# Patient Record
Sex: MALE | Race: WHITE | Employment: OTHER | URBAN - METROPOLITAN AREA
[De-identification: names, ages, dates, MRNs, and addresses within clinical notes are randomized per-mention and may not be internally consistent; named-entity substitution may affect disease eponyms.]

---

## 2017-06-14 PROBLEM — R00.1 SYMPTOMATIC BRADYCARDIA: Status: ACTIVE | Noted: 2017-06-14

## 2017-06-14 PROBLEM — I95.9 HYPOTENSION: Status: ACTIVE | Noted: 2017-06-14

## 2019-10-02 ENCOUNTER — OFFICE VISIT (OUTPATIENT)
Dept: CARDIOLOGY CLINIC | Age: 84
End: 2019-10-02

## 2019-10-02 VITALS
WEIGHT: 220 LBS | OXYGEN SATURATION: 97 % | DIASTOLIC BLOOD PRESSURE: 72 MMHG | BODY MASS INDEX: 29.8 KG/M2 | HEIGHT: 72 IN | SYSTOLIC BLOOD PRESSURE: 106 MMHG | RESPIRATION RATE: 18 BRPM | HEART RATE: 68 BPM

## 2019-10-02 DIAGNOSIS — J43.8 OTHER EMPHYSEMA (HCC): ICD-10-CM

## 2019-10-02 DIAGNOSIS — E78.00 ELEVATED CHOLESTEROL: ICD-10-CM

## 2019-10-02 DIAGNOSIS — I72.3 ILIAC ANEURYSM (HCC): ICD-10-CM

## 2019-10-02 DIAGNOSIS — I35.0 NONRHEUMATIC AORTIC VALVE STENOSIS: ICD-10-CM

## 2019-10-02 DIAGNOSIS — I25.119 CORONARY ARTERY DISEASE INVOLVING NATIVE CORONARY ARTERY OF NATIVE HEART WITH ANGINA PECTORIS (HCC): Primary | ICD-10-CM

## 2019-10-02 DIAGNOSIS — I10 ESSENTIAL HYPERTENSION: ICD-10-CM

## 2019-10-02 DIAGNOSIS — I71.40 ABDOMINAL AORTIC ANEURYSM (AAA) WITHOUT RUPTURE: ICD-10-CM

## 2019-10-02 RX ORDER — AMLODIPINE BESYLATE 2.5 MG/1
TABLET ORAL DAILY
COMMUNITY
End: 2019-10-02

## 2019-10-02 RX ORDER — ASPIRIN 81 MG/1
TABLET ORAL DAILY
COMMUNITY

## 2019-10-02 RX ORDER — GLIPIZIDE 5 MG/1
TABLET ORAL DAILY
COMMUNITY

## 2019-10-02 RX ORDER — MEMANTINE HYDROCHLORIDE 10 MG/1
10 TABLET ORAL 2 TIMES DAILY
COMMUNITY

## 2019-10-02 RX ORDER — MONTELUKAST SODIUM 10 MG/1
10 TABLET ORAL DAILY
COMMUNITY

## 2019-10-02 NOTE — PROGRESS NOTES
Bertell Frankel     1934       Sidney Oliver MD, [facility]  Date of Visit-10/2/2019   PCP is Lashay Castillo MD   [facility] [place]  [facility]  HPI:  Bertell Frankel is a 80 y.o. male   Pt referred for atherosclerosis. Hx of CAD. Had a cath 9/5/07 with a patent left circumflex stent. Hx of inferior MI stemi. Cath 2/3/16 PCI to RCA. Follow up cath 2/16 showed open stent to RCA. Cath 9/12/16 showed open stents. Hx of AAA repair December 2006. Has iliac stenosis, DM, CKD, and bradycardia. Pt is present with his grandson. Pt uses a cane. Pt moved here from [place] last year. Pt's records are from [facility], but he is now being seen by the [place]. Pt's grandson states that the [place] has been talking about putting in more stents. Pt was put on lisinopril and metoprolol. Pt's grandson states that his grandfather's BP has been lower since adding those medications. Pt is experiencing fatigue. Pt's grandson states that his grandfather feels chest pain when walking short distances. Pt fell yesterday and fell on his side. Pt states he got lightheaded after getting up from a chair too fast. Pt denies chest pain at that time. Pt's grandson states that his grandfather often experiences lightheadedness. Pt's grandson states that his grandfather has memory problems. Pt has not been able to walk regularly secondary to the heat. Pt's grandson wants to know if his grandfather can get into cardiac rehab here or as close as possible to [facility]. Pt's last stent was in March. Denies edema, syncope or shortness of breath at rest, has no tachycardia, palpitations or sense of arrhythmia.       EKG: SR, with PVC and PRWP      Cardiac History from [facility] while in [place] [place] area:   CAD  AS  CHF systolic chronic   ECHO [facility] 11-21-17   Moderate LCH, EF 37-45%, AMANDA 1.6 cm2 and mean of 9 mm Hg  NUKE 6-17-17 EF 51% normal perfusion  Echo 6-14-17 EF 55% mean 10 and Yosi 1.6  PCI POBA 2-3-16 distal RCA  Cath 6-2008  LM 20; LAD MLI, Circ stent to OM1 patent, RCA MLI  MI and PCI to Circ 2002    A. Cardiac cath: 9/5/07 due to false positive NST: Patent LCx stent with otherwise nonobstructing CAD. EF 55%  B. Negative NST on 01/29/2016  C. Hx Inferior STEMI s/p cardiac cath: 2/3/16 by Dr. Iam Miranda: Severe mid and distal RCA disease with BERLIN 0 flow in prl. Successful PCI of RCA with JÚNIOR  D. ACS s/p cardiac catheterization 2/9/16 by Dr. Church Primer: patent RCA stents. Non-obstructive CAD LCx and LAD-unchanged from cardiac catheterization 2/3/16    [facility] 9/12/16, Dr. Belkis Padgett, [address]: Patent RCA stents and LCx stent with mild-moderate/medically-treated CAD  HTN, AAA repair, high grade iliac stenosis, DM2, COPD, ADRIAN, XOL, dementia,JOSÉ MIGUEL/CKD  Assessment/Plan:     1. Pt is mainly here to establish rehab. There is some difficulty at the [address] [place] [address]. They are willing to do rehab here at [address] [place] [address] and seem to have coverage authorized. Was living in [place] and we have some [facility] recs to review    2. CAD. Most recent stent in March. All recs are at [address] [place] [address] and if they wish to establish care here too, we will need to get that information      3. Relative Hypotension. I would stop amlodipine. HTN  BP Readings from Last 6 Encounters:   10/02/19 106/72   06/19/18 (!) 157/97   11/22/17 152/90   06/17/17 113/62   02/03/17 (!) 83/59   02/01/17 (!) 137/92       4. Lipids on high potency statin as appropriate for secondary prevention. 5. Mild AS  6. Likely normal LV fx, only one study with reduced EF and not clear if dx of CHF  7.  AAA and iliac aneurysm  F/u in 3 months in [facility] Appointments   Date Time Provider Andrew Burton   1/8/2020 11:40 AM Charleen Deutsch MD [address]      Patient Instructions   Please stop your amlodipine (norvasc) A referral has been sent for to cardiac rehab at [address] [place] [address], please call [phone] if you do not hear from them in 48 hours to set this up. We will see you back in 3 months in [place]. Key CAD CHF Meds             aspirin delayed-release 81 mg tablet (Taking) Take  by mouth daily. lisinopril (PRINIVIL, ZESTRIL) 5 mg tablet (Taking) Take 1 Tab by mouth daily. isosorbide mononitrate ER (IMDUR) 30 mg tablet (Taking) Take 1 Tab by mouth nightly. clopidogrel (PLAVIX) 75 mg tab (Taking) Take 1 Tab by mouth daily. nitroglycerin (NITROLINGUAL) 0.4 mg/dose spray (Taking) 1 Spray by SubLINGual route every five (5) minutes as needed. dilTIAZem ER (CARDIZEM LA) 120 mg tablet Take 120 mg by mouth daily. metoprolol tartrate (LOPRESSOR) 25 mg tablet Take 0.5 Tabs by mouth every twelve (12) hours. aspirin (ASPIRIN) 325 mg tablet Take 1 Tab by mouth daily. rosuvastatin (CRESTOR) 20 mg tablet Take 20 mg by mouth nightly. Impression:   1. Coronary artery disease involving native coronary artery of native heart with angina pectoris (HCC)    2. Elevated cholesterol    3. Nonrheumatic aortic valve stenosis    4. Essential hypertension    5. Iliac aneurysm ([facility]    6. copd    7. DM (diabetes mellitus) type II uncontrolled, periph vascular disorder ([facility]    8. Abdominal aortic aneurysm (AAA) without rupture ([facility]       Review of Systems   Constitutional: Negative for fever and weight loss. HENT: Negative for ear pain. Eyes: Negative for blurred vision. Respiratory: Positive for shortness of breath. Negative for cough. Cardiovascular: Positive for chest pain and leg swelling. Gastrointestinal: Negative for blood in stool. Genitourinary: Negative for hematuria. Musculoskeletal: Negative for myalgias. Skin: Negative for rash. Neurological: Positive for dizziness. Negative for seizures and loss of consciousness. Psychiatric/Behavioral: Positive for memory loss. The patient is not nervous/anxious.       Past Medical History:   Diagnosis Date    Acid reflux     Aneurysm of aorta (HCC)     Chronic obstructive pulmonary disease ([facility]     Coronary artery disease     Dementia ([facility]     Depression     Diabetes mellitus ([facility]     Elevated cholesterol     Glaucoma     Glaucoma     High triglycerides     [place] (hard of hearing)     Hypertension     Ill-defined condition     some memory loss    Unspecified sleep apnea     C PAP    Urethral stricture       Social Hx= reports that he quit smoking about 44 years ago. He has a 24.00 pack-year smoking history. He has never used smokeless tobacco. He reports that he drinks alcohol. He reports that he does not use drugs. Exam and Labs:  /72 (BP 1 Location: Left arm, BP Patient Position: Sitting)   Pulse 68   Resp 18   Ht 6' (1.829 m)   Wt 220 lb (99.8 kg)   SpO2 97%   BMI 29.84 kg/m² Constitutional:  NAD, comfortable  Head: NC,AT. Eyes: No scleral icterus. Neck:  Neck supple. No JVD present. Throat: moist mucous membranes. Chest: Effort normal & normal respiratory excursion . Neurological: alert, conversant and oriented . Skin: No bruising on the back or shoulder Skin is not cold. No obvious systemic rash noted. Not diaphoretic. No erythema. Psychiatric:  Grossly normal mood and affect. Behavior appears normal. Extremities:  no clubbing or cyanosis. Abdomen: non distended    Lungs:breath sounds normal. No stridor. distress, wheezes or  Rales. Heart:2/6 short ROSIE and questionable gallop normal rate, regular rhythm, normal S1, S2, no rubs or clicks, PMI non displaced.     Edema: Edema is 1+ pitting edema to the mid shin bilateral.  Lab Results   Component Value Date/Time    Cholesterol, total 166 11/21/2017 12:59 AM    HDL Cholesterol 35 (L) 11/21/2017 12:59 AM    LDL, calculated 84 11/21/2017 12:59 AM    Triglyceride 236 (H) 11/21/2017 12:59 AM    CHOL/HDL Ratio 4.7 11/21/2017 12:59 AM     Lab Results   Component Value Date/Time    Sodium 134 (L) 06/19/2018 11:20 AM    Potassium 4.5 06/19/2018 11:20 AM    Chloride 97 (L) 06/19/2018 11:20 AM    CO2 26 06/19/2018 11:20 AM    CO2, TOTAL 30 06/19/2018 01:25 PM    Anion gap 10 06/19/2018 11:20 AM    Glucose 517 (HH) 06/19/2018 11:20 AM    BUN 20 06/19/2018 11:20 AM    Creatinine 1.2 06/19/2018 11:20 AM    GFR est AA >60 06/19/2018 11:20 AM    GFR est non-AA >60 06/19/2018 11:20 AM    Calcium 8.6 06/19/2018 11:20 AM      Wt Readings from Last 3 Encounters:   10/02/19 220 lb (99.8 kg)   06/19/18 200 lb (90.7 kg)   11/22/17 207 lb 3.7 oz (94 kg)      BP Readings from Last 3 Encounters:   10/02/19 106/72   06/19/18 (!) 157/97   11/22/17 152/90      Current Outpatient Medications   Medication Sig    glipiZIDE (GLUCOTROL) 5 mg tablet Take  by mouth daily.  aspirin delayed-release 81 mg tablet Take  by mouth daily.  amLODIPine (NORVASC) 2.5 mg tablet Take  by mouth daily.  memantine (NAMENDA) 10 mg tablet Take 10 mg by mouth two (2) times a day.  montelukast (SINGULAIR) 10 mg tablet Take 10 mg by mouth daily.  lisinopril (PRINIVIL, ZESTRIL) 5 mg tablet Take 1 Tab by mouth daily.  isosorbide mononitrate ER (IMDUR) 30 mg tablet Take 1 Tab by mouth nightly.  clopidogrel (PLAVIX) 75 mg tab Take 1 Tab by mouth daily.  donepezil (ARICEPT) 5 mg tablet Take 10 mg by mouth daily.  fluticasone (FLOVENT DISKUS) 250 mcg/actuation dsdv Take  by inhalation.  albuterol (VENTOLIN HFA) 90 mcg/actuation inhaler Take 1 Puff by inhalation every six (6) hours as needed.  metFORMIN (GLUCOPHAGE) 500 mg tablet Take  by mouth two (2) times daily (with meals).  timolol (TIMOPTIC OCUDOSE) 0.5 % Dpet Administer 1 Drop to both eyes two (2) times a day.  sertraline (ZOLOFT) 100 mg tablet Take 150 mg by mouth nightly.  finasteride (PROSCAR) 5 mg tablet Take 5 mg by mouth daily.  latanoprost (XALATAN) 0.005 % ophthalmic solution Administer 1 Drop to both eyes nightly.  nitroglycerin (NITROLINGUAL) 0.4 mg/dose spray 1 Spray by SubLINGual route every five (5) minutes as needed.       glimepiride (AMARYL) 2 mg tablet Take 2 mg by mouth every morning.  dilTIAZem ER (CARDIZEM LA) 120 mg tablet Take 120 mg by mouth daily.  LORazepam (ATIVAN) 1 mg tablet Take 0.5 Tabs by mouth every eight (8) hours as needed. Max Daily Amount: 1.5 mg.    metoprolol tartrate (LOPRESSOR) 25 mg tablet Take 0.5 Tabs by mouth every twelve (12) hours. (Patient taking differently: Take  by mouth nightly.)    aspirin (ASPIRIN) 325 mg tablet Take 1 Tab by mouth daily.  pramoxine-hydrocortisone (ANALPRAM HC) 1-1 % topical cream Apply  to affected area as needed for Itching.  ranitidine (ZANTAC) 150 mg tablet Take 150 mg by mouth two (2) times a day.  rosuvastatin (CRESTOR) 20 mg tablet Take 20 mg by mouth nightly.  gabapentin (NEURONTIN) 300 mg capsule Take 300 mg by mouth three (3) times daily.  rOPINIRole (REQUIP) 0.25 mg tablet Take  by mouth three (3) times daily. No current facility-administered medications for this visit. Impression see above.       Written by Cristian Hope, as dictated by Ely Gomez MD.

## 2019-10-02 NOTE — Clinical Note
10/2/19 Patient: Aury Hassan YOB: 1934 Date of Visit: 10/2/2019 Dian Muro MD 
VIA Dear Dian Muro MD, Thank you for referring Mr. Aury Hassan to [facility] for evaluation. My notes for this consultation are attached. If you have questions, please do not hesitate to call me. I look forward to following your patient along with you.  
 
 
Sincerely, 
 
Sejal Galenaa MD

## 2019-10-02 NOTE — PROGRESS NOTES
Chief Complaint   Patient presents with    New Patient    Coronary Artery Disease     Visit Vitals  /72 (BP 1 Location: Left arm, BP Patient Position: Sitting)   Pulse 68   Resp 18   Ht 6' (1.829 m)   Wt 220 lb (99.8 kg)   SpO2 97%   BMI 29.84 kg/m²     Patient presents in office with c/o LUA. [facility]. Sees cardiologist Via Lupillo Arora at [place]. PCP also at [place]. No recent hospital visits.

## 2019-10-02 NOTE — PATIENT INSTRUCTIONS
Please stop your amlodipine (norvasc) A referral has been sent for to cardiac rehab at [facility], please call [phone] if you do not hear from them in 48 hours to set this up. We will see you back in 3 months in [place].

## 2019-10-06 PROBLEM — I72.3 ILIAC ANEURYSM (HCC): Status: ACTIVE | Noted: 2019-10-06

## 2019-10-06 PROBLEM — J43.8 OTHER EMPHYSEMA (HCC): Status: ACTIVE | Noted: 2019-10-06

## 2019-10-06 PROBLEM — I35.0 AORTIC STENOSIS: Status: ACTIVE | Noted: 2019-10-06

## 2022-03-18 PROBLEM — I72.3 ILIAC ANEURYSM (HCC): Status: ACTIVE | Noted: 2019-10-06

## 2022-03-18 PROBLEM — I35.0 AORTIC STENOSIS: Status: ACTIVE | Noted: 2019-10-06

## 2022-03-19 PROBLEM — J43.8 OTHER EMPHYSEMA (HCC): Status: ACTIVE | Noted: 2019-10-06

## 2022-03-19 PROBLEM — R00.1 SYMPTOMATIC BRADYCARDIA: Status: ACTIVE | Noted: 2017-06-14

## 2022-03-19 PROBLEM — I95.9 HYPOTENSION: Status: ACTIVE | Noted: 2017-06-14

## 2023-01-01 ENCOUNTER — HOSPITAL ENCOUNTER (INPATIENT)
Age: 88
LOS: 1 days | DRG: 871 | End: 2023-03-20
Attending: EMERGENCY MEDICINE | Admitting: INTERNAL MEDICINE
Payer: MEDICARE

## 2023-01-01 ENCOUNTER — APPOINTMENT (OUTPATIENT)
Dept: CT IMAGING | Age: 88
DRG: 871 | End: 2023-01-01
Attending: NURSE PRACTITIONER
Payer: MEDICARE

## 2023-01-01 ENCOUNTER — APPOINTMENT (OUTPATIENT)
Dept: GENERAL RADIOLOGY | Age: 88
DRG: 871 | End: 2023-01-01
Attending: EMERGENCY MEDICINE
Payer: MEDICARE

## 2023-01-01 VITALS — OXYGEN SATURATION: 93 % | SYSTOLIC BLOOD PRESSURE: 96 MMHG | DIASTOLIC BLOOD PRESSURE: 67 MMHG | TEMPERATURE: 97.8 F

## 2023-01-01 DIAGNOSIS — J96.01 ACUTE RESPIRATORY FAILURE WITH HYPOXIA (HCC): Primary | ICD-10-CM

## 2023-01-01 LAB
ALBUMIN SERPL-MCNC: 2.8 G/DL (ref 3.5–5)
ALBUMIN/GLOB SERPL: 0.9 (ref 1.1–2.2)
ALP SERPL-CCNC: 102 U/L (ref 45–117)
ALT SERPL-CCNC: 659 U/L (ref 12–78)
ANION GAP BLD CALC-SCNC: 19 (ref 10–20)
ANION GAP SERPL CALC-SCNC: 19 MMOL/L (ref 5–15)
ARTERIAL PATENCY WRIST A: POSITIVE
AST SERPL-CCNC: 937 U/L (ref 15–37)
BASE DEFICIT BLD-SCNC: 13.3 MMOL/L
BASE DEFICIT BLD-SCNC: 19.3 MMOL/L
BASOPHILS # BLD: 0 K/UL (ref 0–0.1)
BASOPHILS NFR BLD: 0 % (ref 0–1)
BILIRUB SERPL-MCNC: 0.6 MG/DL (ref 0.2–1)
BNP SERPL-MCNC: ABNORMAL PG/ML
BUN SERPL-MCNC: 35 MG/DL (ref 6–20)
BUN/CREAT SERPL: 14 (ref 12–20)
CA-I BLD-MCNC: 1.09 MMOL/L (ref 1.12–1.32)
CALCIUM SERPL-MCNC: 7.9 MG/DL (ref 8.5–10.1)
CHLORIDE BLD-SCNC: 114 MMOL/L (ref 100–108)
CHLORIDE SERPL-SCNC: 112 MMOL/L (ref 97–108)
CO2 BLD-SCNC: 13 MMOL/L (ref 19–24)
CO2 SERPL-SCNC: 13 MMOL/L (ref 21–32)
COMMENT, HOLDF: NORMAL
CREAT SERPL-MCNC: 2.43 MG/DL (ref 0.7–1.3)
CREAT UR-MCNC: 1.8 MG/DL (ref 0.6–1.3)
DIFFERENTIAL METHOD BLD: ABNORMAL
EOSINOPHIL # BLD: 0 K/UL (ref 0–0.4)
EOSINOPHIL NFR BLD: 0 % (ref 0–7)
ERYTHROCYTE [DISTWIDTH] IN BLOOD BY AUTOMATED COUNT: 14.6 % (ref 11.5–14.5)
GAS FLOW.O2 O2 DELIVERY SYS: ABNORMAL
GAS FLOW.O2 SETTING OXYMISER: 29 BPM
GLOBULIN SER CALC-MCNC: 3.1 G/DL (ref 2–4)
GLUCOSE BLD STRIP.AUTO-MCNC: 284 MG/DL (ref 65–117)
GLUCOSE BLD STRIP.AUTO-MCNC: 486 MG/DL (ref 74–106)
GLUCOSE SERPL-MCNC: 481 MG/DL (ref 65–100)
HCO3 BLD-SCNC: 10.4 MMOL/L (ref 22–26)
HCO3 BLDA-SCNC: 13 MMOL/L
HCT VFR BLD AUTO: 46.5 % (ref 36.6–50.3)
HGB BLD-MCNC: 14 G/DL (ref 12.1–17)
IMM GRANULOCYTES # BLD AUTO: 0 K/UL
IMM GRANULOCYTES NFR BLD AUTO: 0 %
LACTATE BLD-SCNC: 13.18 MMOL/L (ref 0.4–2)
LACTATE SERPL-SCNC: 11.5 MMOL/L (ref 0.4–2)
LACTATE SERPL-SCNC: 14.5 MMOL/L (ref 0.4–2)
LYMPHOCYTES # BLD: 1.4 K/UL (ref 0.8–3.5)
LYMPHOCYTES NFR BLD: 8 % (ref 12–49)
MCH RBC QN AUTO: 29.4 PG (ref 26–34)
MCHC RBC AUTO-ENTMCNC: 30.1 G/DL (ref 30–36.5)
MCV RBC AUTO: 97.5 FL (ref 80–99)
MONOCYTES # BLD: 1.6 K/UL (ref 0–1)
MONOCYTES NFR BLD: 9 % (ref 5–13)
NEUTS BAND NFR BLD MANUAL: 2 % (ref 0–6)
NEUTS SEG # BLD: 14.6 K/UL (ref 1.8–8)
NEUTS SEG NFR BLD: 81 % (ref 32–75)
NRBC # BLD: 0 K/UL (ref 0–0.01)
NRBC BLD-RTO: 0 PER 100 WBC
O2/TOTAL GAS SETTING VFR VENT: 15 %
PCO2 BLD: 19.8 MMHG (ref 35–45)
PCO2 BLDV: 56.2 MMHG (ref 41–51)
PH BLD: 7.33 (ref 7.35–7.45)
PH BLDV: 6.97 (ref 7.32–7.42)
PLATELET # BLD AUTO: 324 K/UL (ref 150–400)
PMV BLD AUTO: 12.3 FL (ref 8.9–12.9)
PO2 BLD: 129 MMHG (ref 80–100)
PO2 BLDV: 31 MMHG (ref 25–40)
POTASSIUM BLD-SCNC: 3.8 MMOL/L (ref 3.5–5.5)
POTASSIUM SERPL-SCNC: 5.1 MMOL/L (ref 3.5–5.1)
PROT SERPL-MCNC: 5.9 G/DL (ref 6.4–8.2)
RBC # BLD AUTO: 4.77 M/UL (ref 4.1–5.7)
RBC MORPH BLD: ABNORMAL
SAMPLES BEING HELD,HOLD: NORMAL
SAO2 % BLD: 32 %
SAO2 % BLD: 98.8 % (ref 92–97)
SERVICE CMNT-IMP: ABNORMAL
SERVICE CMNT-IMP: ABNORMAL
SODIUM BLD-SCNC: 146 MMOL/L (ref 136–145)
SODIUM SERPL-SCNC: 144 MMOL/L (ref 136–145)
SPECIMEN SITE: ABNORMAL
SPECIMEN TYPE: ABNORMAL
TROPONIN I SERPL HS-MCNC: ABNORMAL NG/L (ref 0–57)
TROPONIN I SERPL HS-MCNC: ABNORMAL NG/L (ref 0–57)
WBC # BLD AUTO: 17.6 K/UL (ref 4.1–11.1)

## 2023-01-01 PROCEDURE — 82947 ASSAY GLUCOSE BLOOD QUANT: CPT

## 2023-01-01 PROCEDURE — 65610000006 HC RM INTENSIVE CARE

## 2023-01-01 PROCEDURE — 74011636637 HC RX REV CODE- 636/637: Performed by: EMERGENCY MEDICINE

## 2023-01-01 PROCEDURE — 74011000250 HC RX REV CODE- 250

## 2023-01-01 PROCEDURE — 93005 ELECTROCARDIOGRAM TRACING: CPT

## 2023-01-01 PROCEDURE — 94640 AIRWAY INHALATION TREATMENT: CPT

## 2023-01-01 PROCEDURE — 96375 TX/PRO/DX INJ NEW DRUG ADDON: CPT

## 2023-01-01 PROCEDURE — 94664 DEMO&/EVAL PT USE INHALER: CPT

## 2023-01-01 PROCEDURE — 74011000250 HC RX REV CODE- 250: Performed by: INTERNAL MEDICINE

## 2023-01-01 PROCEDURE — 99223 1ST HOSP IP/OBS HIGH 75: CPT | Performed by: SPECIALIST

## 2023-01-01 PROCEDURE — 36415 COLL VENOUS BLD VENIPUNCTURE: CPT

## 2023-01-01 PROCEDURE — 99285 EMERGENCY DEPT VISIT HI MDM: CPT

## 2023-01-01 PROCEDURE — 74011250636 HC RX REV CODE- 250/636: Performed by: INTERNAL MEDICINE

## 2023-01-01 PROCEDURE — 71045 X-RAY EXAM CHEST 1 VIEW: CPT

## 2023-01-01 PROCEDURE — 74011250636 HC RX REV CODE- 250/636: Performed by: EMERGENCY MEDICINE

## 2023-01-01 PROCEDURE — 74011000258 HC RX REV CODE- 258: Performed by: EMERGENCY MEDICINE

## 2023-01-01 PROCEDURE — 84484 ASSAY OF TROPONIN QUANT: CPT

## 2023-01-01 PROCEDURE — 82803 BLOOD GASES ANY COMBINATION: CPT

## 2023-01-01 PROCEDURE — 85025 COMPLETE CBC W/AUTO DIFF WBC: CPT

## 2023-01-01 PROCEDURE — 83880 ASSAY OF NATRIURETIC PEPTIDE: CPT

## 2023-01-01 PROCEDURE — 82962 GLUCOSE BLOOD TEST: CPT

## 2023-01-01 PROCEDURE — 80053 COMPREHEN METABOLIC PANEL: CPT

## 2023-01-01 PROCEDURE — 83605 ASSAY OF LACTIC ACID: CPT

## 2023-01-01 PROCEDURE — 96374 THER/PROPH/DIAG INJ IV PUSH: CPT

## 2023-01-01 PROCEDURE — 87040 BLOOD CULTURE FOR BACTERIA: CPT

## 2023-01-01 RX ORDER — ACETAMINOPHEN 325 MG/1
650 TABLET ORAL
Status: DISCONTINUED | OUTPATIENT
Start: 2023-01-01 | End: 2023-01-01 | Stop reason: HOSPADM

## 2023-01-01 RX ORDER — HYDROMORPHONE HYDROCHLORIDE 1 MG/ML
0.5 INJECTION, SOLUTION INTRAMUSCULAR; INTRAVENOUS; SUBCUTANEOUS ONCE
Status: COMPLETED | OUTPATIENT
Start: 2023-01-01 | End: 2023-01-01

## 2023-01-01 RX ORDER — IPRATROPIUM BROMIDE AND ALBUTEROL SULFATE 2.5; .5 MG/3ML; MG/3ML
3 SOLUTION RESPIRATORY (INHALATION)
Status: COMPLETED | OUTPATIENT
Start: 2023-01-01 | End: 2023-01-01

## 2023-01-01 RX ORDER — SODIUM CHLORIDE 0.9 % (FLUSH) 0.9 %
5-40 SYRINGE (ML) INJECTION AS NEEDED
Status: DISCONTINUED | OUTPATIENT
Start: 2023-01-01 | End: 2023-01-01 | Stop reason: HOSPADM

## 2023-01-01 RX ORDER — FAMOTIDINE 20 MG/1
20 TABLET, FILM COATED ORAL
COMMUNITY

## 2023-01-01 RX ORDER — IPRATROPIUM BROMIDE AND ALBUTEROL SULFATE 2.5; .5 MG/3ML; MG/3ML
SOLUTION RESPIRATORY (INHALATION)
Status: COMPLETED
Start: 2023-01-01 | End: 2023-01-01

## 2023-01-01 RX ORDER — HYDROMORPHONE HYDROCHLORIDE 2 MG/ML
1 INJECTION, SOLUTION INTRAMUSCULAR; INTRAVENOUS; SUBCUTANEOUS
Status: DISCONTINUED | OUTPATIENT
Start: 2023-01-01 | End: 2023-01-01 | Stop reason: HOSPADM

## 2023-01-01 RX ORDER — IBUPROFEN 200 MG
4 TABLET ORAL AS NEEDED
Status: DISCONTINUED | OUTPATIENT
Start: 2023-01-01 | End: 2023-01-01 | Stop reason: HOSPADM

## 2023-01-01 RX ORDER — SODIUM BICARBONATE 1 MEQ/ML
50 SYRINGE (ML) INTRAVENOUS ONCE
Status: DISCONTINUED | OUTPATIENT
Start: 2023-01-01 | End: 2023-01-01 | Stop reason: SDUPTHER

## 2023-01-01 RX ORDER — POLYETHYLENE GLYCOL 3350 17 G/17G
17 POWDER, FOR SOLUTION ORAL DAILY PRN
Status: DISCONTINUED | OUTPATIENT
Start: 2023-01-01 | End: 2023-01-01 | Stop reason: HOSPADM

## 2023-01-01 RX ORDER — FUROSEMIDE 10 MG/ML
40 INJECTION INTRAMUSCULAR; INTRAVENOUS
Status: COMPLETED | OUTPATIENT
Start: 2023-01-01 | End: 2023-01-01

## 2023-01-01 RX ORDER — DEXTROSE 40 %
15 GEL (GRAM) ORAL AS NEEDED
COMMUNITY

## 2023-01-01 RX ORDER — ACETAMINOPHEN 650 MG/1
650 SUPPOSITORY RECTAL
Status: DISCONTINUED | OUTPATIENT
Start: 2023-01-01 | End: 2023-01-01 | Stop reason: HOSPADM

## 2023-01-01 RX ORDER — ONDANSETRON 2 MG/ML
4 INJECTION INTRAMUSCULAR; INTRAVENOUS
Status: DISCONTINUED | OUTPATIENT
Start: 2023-01-01 | End: 2023-01-01 | Stop reason: HOSPADM

## 2023-01-01 RX ORDER — ATORVASTATIN CALCIUM 40 MG/1
40 TABLET, FILM COATED ORAL DAILY
COMMUNITY

## 2023-01-01 RX ORDER — INSULIN LISPRO 100 [IU]/ML
INJECTION, SOLUTION INTRAVENOUS; SUBCUTANEOUS EVERY 4 HOURS
Status: DISCONTINUED | OUTPATIENT
Start: 2023-01-01 | End: 2023-01-01 | Stop reason: HOSPADM

## 2023-01-01 RX ORDER — SODIUM BICARBONATE IN D5W 150/1000ML
PLASTIC BAG, INJECTION (ML) INTRAVENOUS CONTINUOUS
Status: DISCONTINUED | OUTPATIENT
Start: 2023-01-01 | End: 2023-01-01 | Stop reason: RX

## 2023-01-01 RX ORDER — LEVOFLOXACIN 5 MG/ML
750 INJECTION, SOLUTION INTRAVENOUS ONCE
Status: COMPLETED | OUTPATIENT
Start: 2023-01-01 | End: 2023-01-01

## 2023-01-01 RX ORDER — ONDANSETRON 4 MG/1
4 TABLET, ORALLY DISINTEGRATING ORAL
Status: DISCONTINUED | OUTPATIENT
Start: 2023-01-01 | End: 2023-01-01 | Stop reason: HOSPADM

## 2023-01-01 RX ORDER — SODIUM CHLORIDE 0.9 % (FLUSH) 0.9 %
5-40 SYRINGE (ML) INJECTION EVERY 8 HOURS
Status: DISCONTINUED | OUTPATIENT
Start: 2023-01-01 | End: 2023-01-01 | Stop reason: HOSPADM

## 2023-01-01 RX ADMIN — IPRATROPIUM BROMIDE AND ALBUTEROL SULFATE 3 ML: .5; 3 SOLUTION RESPIRATORY (INHALATION) at 07:27

## 2023-01-01 RX ADMIN — Medication 10 UNITS: at 10:29

## 2023-01-01 RX ADMIN — IPRATROPIUM BROMIDE AND ALBUTEROL SULFATE 3 ML: 2.5; .5 SOLUTION RESPIRATORY (INHALATION) at 07:27

## 2023-01-01 RX ADMIN — LEVOFLOXACIN 750 MG: 5 INJECTION, SOLUTION INTRAVENOUS at 10:40

## 2023-01-01 RX ADMIN — SODIUM BICARBONATE: 84 INJECTION, SOLUTION INTRAVENOUS at 13:55

## 2023-01-01 RX ADMIN — PIPERACILLIN AND TAZOBACTAM 4.5 G: 4; .5 INJECTION, POWDER, FOR SOLUTION INTRAVENOUS at 10:35

## 2023-01-01 RX ADMIN — SODIUM BICARBONATE 50 MEQ: 84 INJECTION, SOLUTION INTRAVENOUS at 11:54

## 2023-01-01 RX ADMIN — HYDROMORPHONE HYDROCHLORIDE 0.5 MG: 1 INJECTION, SOLUTION INTRAMUSCULAR; INTRAVENOUS; SUBCUTANEOUS at 11:53

## 2023-01-01 RX ADMIN — SODIUM CHLORIDE, PRESERVATIVE FREE 20 MG: 5 INJECTION INTRAVENOUS at 13:19

## 2023-01-01 RX ADMIN — SODIUM CHLORIDE, PRESERVATIVE FREE 10 ML: 5 INJECTION INTRAVENOUS at 13:22

## 2023-01-01 RX ADMIN — FUROSEMIDE 40 MG: 10 INJECTION, SOLUTION INTRAMUSCULAR; INTRAVENOUS at 09:20

## 2023-01-22 ENCOUNTER — APPOINTMENT (OUTPATIENT)
Dept: CT IMAGING | Age: 88
End: 2023-01-22
Attending: EMERGENCY MEDICINE
Payer: MEDICARE

## 2023-01-22 ENCOUNTER — HOSPITAL ENCOUNTER (INPATIENT)
Age: 88
LOS: 3 days | Discharge: NURSING, REHAB OR LONG-TERM CARE | End: 2023-01-26
Attending: EMERGENCY MEDICINE | Admitting: FAMILY MEDICINE
Payer: MEDICARE

## 2023-01-22 ENCOUNTER — APPOINTMENT (OUTPATIENT)
Dept: GENERAL RADIOLOGY | Age: 88
End: 2023-01-22
Attending: EMERGENCY MEDICINE
Payer: MEDICARE

## 2023-01-22 DIAGNOSIS — I63.9 CEREBROVASCULAR ACCIDENT (CVA), UNSPECIFIED MECHANISM (HCC): ICD-10-CM

## 2023-01-22 DIAGNOSIS — I35.0 AORTIC VALVE STENOSIS, ETIOLOGY OF CARDIAC VALVE DISEASE UNSPECIFIED: ICD-10-CM
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
## 2023-01-22 DIAGNOSIS — I25.10 CORONARY ARTERY DISEASE INVOLVING NATIVE HEART, UNSPECIFIED VESSEL OR LESION TYPE, UNSPECIFIED WHETHER ANGINA PRESENT: ICD-10-CM

## 2023-01-22 DIAGNOSIS — I10 HYPERTENSION, UNSPECIFIED TYPE: ICD-10-CM

## 2023-01-22 DIAGNOSIS — I50.21 SYSTOLIC CHF, ACUTE (HCC): ICD-10-CM

## 2023-01-22 DIAGNOSIS — I21.4 NSTEMI (NON-ST ELEVATED MYOCARDIAL INFARCTION) (HCC): ICD-10-CM
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
## 2023-01-22 DIAGNOSIS — I95.9 HYPOTENSION, UNSPECIFIED HYPOTENSION TYPE: ICD-10-CM

## 2023-01-22 DIAGNOSIS — I25.10 CORONARY ARTERY DISEASE INVOLVING NATIVE CORONARY ARTERY OF NATIVE HEART WITHOUT ANGINA PECTORIS: ICD-10-CM

## 2023-01-22 LAB
BASOPHILS # BLD: 0.1 K/UL (ref 0–0.1)
BASOPHILS NFR BLD: 1 % (ref 0–1)
DIFFERENTIAL METHOD BLD: ABNORMAL
EOSINOPHIL # BLD: 0.4 K/UL (ref 0–0.4)
EOSINOPHIL NFR BLD: 4 % (ref 0–7)
ERYTHROCYTE [DISTWIDTH] IN BLOOD BY AUTOMATED COUNT: 13.5 % (ref 11.5–14.5)
HCT VFR BLD AUTO: 45.8 % (ref 36.6–50.3)
HGB BLD-MCNC: 14.5 G/DL (ref 12.1–17)
IMM GRANULOCYTES # BLD AUTO: 0.1 K/UL (ref 0–0.04)
IMM GRANULOCYTES NFR BLD AUTO: 1 % (ref 0–0.5)
LYMPHOCYTES # BLD: 1.8 K/UL (ref 0.8–3.5)
LYMPHOCYTES NFR BLD: 19 % (ref 12–49)
MCH RBC QN AUTO: 29.1 PG (ref 26–34)
MCHC RBC AUTO-ENTMCNC: 31.7 G/DL (ref 30–36.5)
MCV RBC AUTO: 92 FL (ref 80–99)
MONOCYTES # BLD: 0.7 K/UL (ref 0–1)
MONOCYTES NFR BLD: 7 % (ref 5–13)
NEUTS SEG # BLD: 6.9 K/UL (ref 1.8–8)
NEUTS SEG NFR BLD: 68 % (ref 32–75)
NRBC # BLD: 0 K/UL (ref 0–0.01)
NRBC BLD-RTO: 0 PER 100 WBC
PLATELET # BLD AUTO: 334 K/UL (ref 150–400)
PMV BLD AUTO: 11.3 FL (ref 8.9–12.9)
RBC # BLD AUTO: 4.98 M/UL (ref 4.1–5.7)
WBC # BLD AUTO: 10 K/UL (ref 4.1–11.1)

## 2023-01-22 PROCEDURE — 85025 COMPLETE CBC W/AUTO DIFF WBC: CPT

## 2023-01-22 PROCEDURE — 93005 ELECTROCARDIOGRAM TRACING: CPT

## 2023-01-22 PROCEDURE — 36415 COLL VENOUS BLD VENIPUNCTURE: CPT

## 2023-01-22 PROCEDURE — 70450 CT HEAD/BRAIN W/O DYE: CPT

## 2023-01-22 PROCEDURE — 80053 COMPREHEN METABOLIC PANEL: CPT

## 2023-01-22 PROCEDURE — 72170 X-RAY EXAM OF PELVIS: CPT

## 2023-01-22 PROCEDURE — 83605 ASSAY OF LACTIC ACID: CPT

## 2023-01-22 PROCEDURE — 99285 EMERGENCY DEPT VISIT HI MDM: CPT

## 2023-01-22 RX ORDER — SODIUM CHLORIDE 0.9 % (FLUSH) 0.9 %
5-40 SYRINGE (ML) INJECTION EVERY 8 HOURS
Status: DISCONTINUED | OUTPATIENT
Start: 2023-01-23 | End: 2023-01-26 | Stop reason: HOSPADM

## 2023-01-22 RX ORDER — ATORVASTATIN CALCIUM 20 MG/1
TABLET, FILM COATED ORAL DAILY
Status: ON HOLD | COMMUNITY
End: 2023-01-26 | Stop reason: SDUPTHER

## 2023-01-22 RX ORDER — GLUCAGON 1 MG
VIAL (EA) INJECTION ONCE
COMMUNITY

## 2023-01-22 RX ORDER — UMECLIDINIUM 62.5 UG/1
1 AEROSOL, POWDER ORAL DAILY
COMMUNITY

## 2023-01-22 RX ORDER — MIRTAZAPINE 7.5 MG/1
7.5 TABLET, FILM COATED ORAL
COMMUNITY

## 2023-01-22 RX ORDER — SIMETHICONE 80 MG
80 TABLET,CHEWABLE ORAL
COMMUNITY

## 2023-01-22 RX ORDER — GUAIFENESIN 100 MG/5ML
81 LIQUID (ML) ORAL DAILY
COMMUNITY

## 2023-01-22 RX ORDER — INSULIN LISPRO 100 [IU]/ML
INJECTION, SOLUTION INTRAVENOUS; SUBCUTANEOUS
COMMUNITY

## 2023-01-22 RX ORDER — RANOLAZINE 500 MG/1
TABLET, EXTENDED RELEASE ORAL 2 TIMES DAILY
COMMUNITY

## 2023-01-22 RX ORDER — ACETAMINOPHEN 325 MG/1
TABLET ORAL
COMMUNITY

## 2023-01-22 RX ORDER — SODIUM CHLORIDE 0.9 % (FLUSH) 0.9 %
5-40 SYRINGE (ML) INJECTION AS NEEDED
Status: DISCONTINUED | OUTPATIENT
Start: 2023-01-22 | End: 2023-01-26 | Stop reason: HOSPADM

## 2023-01-22 RX ORDER — INSULIN GLARGINE-YFGN 100 [IU]/ML
INJECTION, SOLUTION SUBCUTANEOUS
COMMUNITY
End: 2023-01-26

## 2023-01-23 ENCOUNTER — APPOINTMENT (OUTPATIENT)
Dept: NON INVASIVE DIAGNOSTICS | Age: 88
End: 2023-01-23
Attending: FAMILY MEDICINE
Payer: MEDICARE

## 2023-01-23 ENCOUNTER — APPOINTMENT (OUTPATIENT)
Dept: CT IMAGING | Age: 88
End: 2023-01-23
Attending: FAMILY MEDICINE
Payer: MEDICARE

## 2023-01-23 PROBLEM — I63.9 ISCHEMIC STROKE (HCC): Status: ACTIVE | Noted: 2023-01-01
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
## 2023-01-23 PROBLEM — I60.9 SAH (SUBARACHNOID HEMORRHAGE) (HCC): Status: ACTIVE | Noted: 2023-01-23

## 2023-01-23 PROBLEM — I63.9 ISCHEMIC STROKE (HCC): Status: ACTIVE | Noted: 2023-01-23

## 2023-01-23 LAB
ALBUMIN SERPL-MCNC: 3 G/DL (ref 3.5–5)
ALBUMIN/GLOB SERPL: 0.8 (ref 1.1–2.2)
ALP SERPL-CCNC: 89 U/L (ref 45–117)
ALT SERPL-CCNC: 14 U/L (ref 12–78)
ANION GAP SERPL CALC-SCNC: 5 MMOL/L (ref 5–15)
ANION GAP SERPL CALC-SCNC: 7 MMOL/L (ref 5–15)
AST SERPL-CCNC: 14 U/L (ref 15–37)
BASOPHILS # BLD: 0.1 K/UL (ref 0–0.1)
BASOPHILS NFR BLD: 1 % (ref 0–1)
BILIRUB SERPL-MCNC: 0.3 MG/DL (ref 0.2–1)
BUN SERPL-MCNC: 17 MG/DL (ref 6–20)
BUN SERPL-MCNC: 22 MG/DL (ref 6–20)
BUN/CREAT SERPL: 14 (ref 12–20)
BUN/CREAT SERPL: 16 (ref 12–20)
CALCIUM SERPL-MCNC: 8.6 MG/DL (ref 8.5–10.1)
CALCIUM SERPL-MCNC: 8.8 MG/DL (ref 8.5–10.1)
CHLORIDE SERPL-SCNC: 107 MMOL/L (ref 97–108)
CHLORIDE SERPL-SCNC: 109 MMOL/L (ref 97–108)
CK SERPL-CCNC: 54 U/L (ref 39–308)
CK SERPL-CCNC: 78 U/L (ref 39–308)
CO2 SERPL-SCNC: 27 MMOL/L (ref 21–32)
CO2 SERPL-SCNC: 30 MMOL/L (ref 21–32)
CREAT SERPL-MCNC: 1.22 MG/DL (ref 0.7–1.3)
CREAT SERPL-MCNC: 1.4 MG/DL (ref 0.7–1.3)
DIFFERENTIAL METHOD BLD: NORMAL
EOSINOPHIL # BLD: 0.4 K/UL (ref 0–0.4)
EOSINOPHIL NFR BLD: 4 % (ref 0–7)
ERYTHROCYTE [DISTWIDTH] IN BLOOD BY AUTOMATED COUNT: 13.5 % (ref 11.5–14.5)
EST. AVERAGE GLUCOSE BLD GHB EST-MCNC: 143 MG/DL
GLOBULIN SER CALC-MCNC: 3.7 G/DL (ref 2–4)
GLUCOSE BLD STRIP.AUTO-MCNC: 119 MG/DL (ref 65–117)
GLUCOSE BLD STRIP.AUTO-MCNC: 130 MG/DL (ref 65–117)
GLUCOSE BLD STRIP.AUTO-MCNC: 178 MG/DL (ref 65–117)
GLUCOSE BLD STRIP.AUTO-MCNC: 31 MG/DL (ref 65–117)
GLUCOSE BLD STRIP.AUTO-MCNC: 36 MG/DL (ref 65–117)
GLUCOSE BLD STRIP.AUTO-MCNC: 38 MG/DL (ref 65–117)
GLUCOSE BLD STRIP.AUTO-MCNC: 63 MG/DL (ref 65–117)
GLUCOSE BLD STRIP.AUTO-MCNC: 76 MG/DL (ref 65–117)
GLUCOSE BLD STRIP.AUTO-MCNC: 99 MG/DL (ref 65–117)
GLUCOSE SERPL-MCNC: 128 MG/DL (ref 65–100)
GLUCOSE SERPL-MCNC: 76 MG/DL (ref 65–100)
HBA1C MFR BLD: 6.6 % (ref 4–5.6)
HCT VFR BLD AUTO: 49.1 % (ref 36.6–50.3)
HGB BLD-MCNC: 14.8 G/DL (ref 12.1–17)
IMM GRANULOCYTES # BLD AUTO: 0 K/UL (ref 0–0.04)
IMM GRANULOCYTES NFR BLD AUTO: 0 % (ref 0–0.5)
LACTATE SERPL-SCNC: 0.9 MMOL/L (ref 0.4–2)
LYMPHOCYTES # BLD: 2 K/UL (ref 0.8–3.5)
LYMPHOCYTES NFR BLD: 20 % (ref 12–49)
MCH RBC QN AUTO: 28.2 PG (ref 26–34)
MCHC RBC AUTO-ENTMCNC: 30.1 G/DL (ref 30–36.5)
MCV RBC AUTO: 93.5 FL (ref 80–99)
MONOCYTES # BLD: 0.8 K/UL (ref 0–1)
MONOCYTES NFR BLD: 8 % (ref 5–13)
NEUTS SEG # BLD: 6.4 K/UL (ref 1.8–8)
NEUTS SEG NFR BLD: 67 % (ref 32–75)
NRBC # BLD: 0 K/UL (ref 0–0.01)
NRBC BLD-RTO: 0 PER 100 WBC
PLATELET # BLD AUTO: 304 K/UL (ref 150–400)
PMV BLD AUTO: 11.3 FL (ref 8.9–12.9)
POTASSIUM SERPL-SCNC: 3.8 MMOL/L (ref 3.5–5.1)
POTASSIUM SERPL-SCNC: 3.9 MMOL/L (ref 3.5–5.1)
PROT SERPL-MCNC: 6.7 G/DL (ref 6.4–8.2)
RBC # BLD AUTO: 5.25 M/UL (ref 4.1–5.7)
SERVICE CMNT-IMP: ABNORMAL
SERVICE CMNT-IMP: NORMAL
SERVICE CMNT-IMP: NORMAL
SODIUM SERPL-SCNC: 139 MMOL/L (ref 136–145)
SODIUM SERPL-SCNC: 146 MMOL/L (ref 136–145)
TROPONIN-HIGH SENSITIVITY: 1467 NG/L (ref 0–76)
TROPONIN-HIGH SENSITIVITY: 2497 NG/L (ref 0–76)
WBC # BLD AUTO: 9.6 K/UL (ref 4.1–11.1)

## 2023-01-23 PROCEDURE — 85025 COMPLETE CBC W/AUTO DIFF WBC: CPT

## 2023-01-23 PROCEDURE — 82550 ASSAY OF CK (CPK): CPT

## 2023-01-23 PROCEDURE — 65270000046 HC RM TELEMETRY

## 2023-01-23 PROCEDURE — 84484 ASSAY OF TROPONIN QUANT: CPT

## 2023-01-23 PROCEDURE — 94760 N-INVAS EAR/PLS OXIMETRY 1: CPT

## 2023-01-23 PROCEDURE — 70450 CT HEAD/BRAIN W/O DYE: CPT

## 2023-01-23 PROCEDURE — 74011000250 HC RX REV CODE- 250: Performed by: EMERGENCY MEDICINE

## 2023-01-23 PROCEDURE — 83036 HEMOGLOBIN GLYCOSYLATED A1C: CPT

## 2023-01-23 PROCEDURE — 36415 COLL VENOUS BLD VENIPUNCTURE: CPT

## 2023-01-23 PROCEDURE — 82962 GLUCOSE BLOOD TEST: CPT

## 2023-01-23 PROCEDURE — 93306 TTE W/DOPPLER COMPLETE: CPT

## 2023-01-23 PROCEDURE — 80048 BASIC METABOLIC PNL TOTAL CA: CPT

## 2023-01-23 PROCEDURE — 92610 EVALUATE SWALLOWING FUNCTION: CPT

## 2023-01-23 PROCEDURE — 74011000250 HC RX REV CODE- 250: Performed by: FAMILY MEDICINE

## 2023-01-23 PROCEDURE — 99222 1ST HOSP IP/OBS MODERATE 55: CPT | Performed by: NURSE PRACTITIONER

## 2023-01-23 PROCEDURE — 74011250637 HC RX REV CODE- 250/637: Performed by: FAMILY MEDICINE

## 2023-01-23 RX ORDER — IBUPROFEN 200 MG
4 TABLET ORAL AS NEEDED
Status: DISCONTINUED | OUTPATIENT
Start: 2023-01-23 | End: 2023-01-23 | Stop reason: SDUPTHER

## 2023-01-23 RX ORDER — DEXTROSE MONOHYDRATE AND SODIUM CHLORIDE 5; .45 G/100ML; G/100ML
75 INJECTION, SOLUTION INTRAVENOUS CONTINUOUS
Status: DISCONTINUED | OUTPATIENT
Start: 2023-01-23 | End: 2023-01-25

## 2023-01-23 RX ORDER — GUAIFENESIN 100 MG/5ML
81 LIQUID (ML) ORAL DAILY
Status: DISCONTINUED | OUTPATIENT
Start: 2023-01-23 | End: 2023-01-26 | Stop reason: HOSPADM

## 2023-01-23 RX ORDER — FAMOTIDINE 20 MG/1
20 TABLET, FILM COATED ORAL DAILY
Status: DISCONTINUED | OUTPATIENT
Start: 2023-01-24 | End: 2023-01-26 | Stop reason: HOSPADM

## 2023-01-23 RX ORDER — ACETAMINOPHEN 325 MG/1
650 TABLET ORAL
Status: DISCONTINUED | OUTPATIENT
Start: 2023-01-23 | End: 2023-01-26 | Stop reason: HOSPADM

## 2023-01-23 RX ORDER — INSULIN LISPRO 100 [IU]/ML
INJECTION, SOLUTION INTRAVENOUS; SUBCUTANEOUS EVERY 6 HOURS
Status: DISCONTINUED | OUTPATIENT
Start: 2023-01-23 | End: 2023-01-26 | Stop reason: HOSPADM

## 2023-01-23 RX ORDER — ATORVASTATIN CALCIUM 40 MG/1
40 TABLET, FILM COATED ORAL
Status: DISCONTINUED | OUTPATIENT
Start: 2023-01-23 | End: 2023-01-26 | Stop reason: HOSPADM

## 2023-01-23 RX ORDER — ACETAMINOPHEN 650 MG/1
650 SUPPOSITORY RECTAL
Status: DISCONTINUED | OUTPATIENT
Start: 2023-01-23 | End: 2023-01-26 | Stop reason: HOSPADM

## 2023-01-23 RX ORDER — IPRATROPIUM BROMIDE AND ALBUTEROL SULFATE 2.5; .5 MG/3ML; MG/3ML
3 SOLUTION RESPIRATORY (INHALATION)
Status: CANCELLED | OUTPATIENT
Start: 2023-01-23

## 2023-01-23 RX ORDER — IBUPROFEN 200 MG
4 TABLET ORAL AS NEEDED
Status: DISCONTINUED | OUTPATIENT
Start: 2023-01-23 | End: 2023-01-26 | Stop reason: HOSPADM

## 2023-01-23 RX ORDER — FAMOTIDINE 20 MG/1
20 TABLET, FILM COATED ORAL EVERY 12 HOURS
Status: DISCONTINUED | OUTPATIENT
Start: 2023-01-23 | End: 2023-01-23

## 2023-01-23 RX ORDER — ACETAMINOPHEN 325 MG/1
650 TABLET ORAL
Status: DISCONTINUED | OUTPATIENT
Start: 2023-01-23 | End: 2023-01-23 | Stop reason: SDUPTHER

## 2023-01-23 RX ADMIN — ACETAMINOPHEN 650 MG: 325 TABLET ORAL at 13:43

## 2023-01-23 RX ADMIN — SODIUM CHLORIDE, PRESERVATIVE FREE 10 ML: 5 INJECTION INTRAVENOUS at 11:01

## 2023-01-23 RX ADMIN — ATORVASTATIN CALCIUM 40 MG: 40 TABLET, FILM COATED ORAL at 22:23

## 2023-01-23 RX ADMIN — DEXTROSE AND SODIUM CHLORIDE 75 ML/HR: 5; 450 INJECTION, SOLUTION INTRAVENOUS at 10:56

## 2023-01-23 RX ADMIN — FAMOTIDINE 20 MG: 20 TABLET, FILM COATED ORAL at 13:43

## 2023-01-23 RX ADMIN — SODIUM CHLORIDE, PRESERVATIVE FREE 10 ML: 5 INJECTION INTRAVENOUS at 13:44

## 2023-01-23 RX ADMIN — DEXTROSE MONOHYDRATE 250 ML: 10 INJECTION, SOLUTION INTRAVENOUS at 10:53

## 2023-01-23 RX ADMIN — ASPIRIN 81 MG: 81 TABLET, CHEWABLE ORAL at 13:43

## 2023-01-23 NOTE — ED NOTES
Verbal report and admission paperwork given to [facility] providers; time allotted for questions but denied having any at this time. Pt transported out of ED via [facility] stretcher.

## 2023-01-23 NOTE — PROGRESS NOTES
SPEECH LANGUAGE PATHOLOGY BEDSIDE SWALLOW EVALUATION  Patient: Sheldon Bone (74 y.o. male)  Date: 1/23/2023  Primary Diagnosis: Ischemic stroke [facility]) [I63.9]  Fall from ground level [W18.30XA]  SAH (subarachnoid hemorrhage) ([facility] [I60.9]       Precautions: Fall, Hearing impairment       ASSESSMENT :  Patient participated in bedside swallow evaluation consisting of thin liquids, purees, solids, and multiple whole medications with liquids. Significant hearing impairment noted. Demonstrated mildly slowed mastication but functional bolus propulsion and oral cavity clearance given additional time. No overt s/s of aspiration occurred with any trialed consistency. Recommend patient continue regular texture diet with thin liquids in conjunction with aspiration precautions. SLP will continue to follow for diet tolerance. Patient will benefit from skilled intervention to address the above impairments. Patients rehabilitation potential is considered to be Good     PLAN :  Recommendations and Planned Interventions:  Continue regular texture diet with thin liquids  Oral medications as tolerated  Oral care via standard toothbrush 2-3x/daily  Aspiration precautions: Feed only when alert and participatory, upright for PO intake, small bites/sips, and encourage self-feeding  SLP will follow for diet tolerance    Frequency/Duration: Patient will be followed by speech-language pathology 2 times a week to address goals. Discharge Recommendations: To Be Determined     SUBJECTIVE:   Patient stated I'm okay when asked if he was swallowing normally.      OBJECTIVE:     Past Medical History:   Diagnosis Date    Acid reflux     Allergic contact dermatitis due to other chemical products     Allergic rhinitis     Alzheimer's disease with late onset (CODE) ([facility]     Aneurysm of aorta (HCC)     Angina pectoris ([facility]     Aortic stenosis 10/06/2019    Atherosclerotic heart disease of native coronary artery without angina pectoris Cellulitis     Chronic kidney disease, stage 3 (HCC)     Chronic obstructive pulmonary disease (HCC)     Coronary artery disease     Dementia (HCC)     Depression     Diabetes mellitus (HCC)     Dry eye syndrome of bilateral lacrimal glands     Elevated cholesterol     Generalized anxiety disorder     GERD without esophagitis     Glaucoma     Glaucoma     High triglycerides     [place] (hard of hearing)     Hyperlipemia     Hypertension     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    Ill-defined condition     some memory loss    Muscle wasting and atrophy, not elsewhere classified, left shoulder     Obstructive sleep apnea     Pain, unspecified     Personal history of COVID-19     Restless legs syndrome     Unspecified sleep apnea     C PAP    Urethral stricture      Past Surgical History:   Procedure Laterality Date    COLONOSCOPY  11/3/2014         COLONOSCOPY N/A 2/1/2017    COLONOSCOPY cold bx polypectomy and heated polypectomy and clipping performed by Tyler Storey MD at [place] [address] AAA REPAIR  12/13/2006 Dr Jeanna Torre APPENDECTOMY      [address]  2002    HX TURP  2004     Diet prior to admission: Unable to obtain from patient report  Current Diet: Regular/thin     Cognitive and Communication Status:  Neurologic State: Alert, Eyes open spontaneously  Orientation Level: Oriented to person  Cognition: Follows commands, Has Hx of dementia per EMR     Oral Assessment:  Oral Assessment  Labial: CNT 2/2 hearing/cognitive impairments  Dentition: Some missing lower dentition, No upper dentition  Oral Hygiene: Moist oral mucosa  Lingual: CNT 2/2 hearing/cognitive impairments  [place]um: CNT 2/2 hearing/cognitive impairments  Mandible: No impairment    P.O. Trials:  Patient Position: Upright in bed  Vocal quality prior to P.O.: No impairment  Consistency Presented: Thin liquid; Solid;Puree;Pill/Tablet  How Presented: Self-fed/presented;SLP-fed/presented; Successive swallows;Straw;Spoon  Bolus Acceptance: No impairment  Bolus Formation/Control: No impairment  Type of Impairment: Mastication  Propulsion: No impairment  Oral Residue: None  Aspiration Signs/Symptoms: None  Pharyngeal Phase Characteristics: No impairment, issues, or problems      NOMS:   The NOMS functional outcome measure was used to quantify this patient's level of swallowing impairment. Based on the NOMS, the patient was determined to be at level 7 for swallow function   NOMS Swallowing Levels:  Level 1 (CN): NPO  Level 2 (CM): NPO but takes consistency in therapy  Level 3 (CL): Takes less than 50% of nutrition p.o. and continues with nonoral feedings; and/or safe with mod cues; and/or max diet restriction  Level 4 (CK): Safe swallow but needs mod cues; and/or mod diet restriction; and/or still requires some nonoral feeding/supplements  Level 5 (CJ): Safe swallow with min diet restriction; and/or needs min cues  Level 6 (CI): Independent with p.o.; rare cues; usually self cues; may need to avoid some foods or needs extra time  Level 7 ([address] [place] [address]): Independent for all p.o.  RENETTA. (2003). National Outcomes Measurement System (NOMS): Adult Speech-Language Pathology User's Guide. After treatment:   Patient left in no apparent distress in bed and Nursing notified    COMMUNICATION/EDUCATION:   The patient's plan of care including recommendations, planned interventions, and recommended diet were discussed with: Registered nurse. Patient/family have participated as able in goal setting and plan of care. Patient/family agree to work toward stated goals and plan of care. Thank you for this referral.  Maribeth Medeiros, SLP  Time Calculation: 15 mins        Problem: Dysphagia (Adult)  Goal: *Acute Goals and Plan of Care (Insert Text)  Description: Speech Therapy Goals:  1. Patient will tolerate least restrictive diet without clinical s/s of aspiration or respiratory decline within seven days. Goal initiated 1/23/23.    Outcome: Progressing Towards Goal

## 2023-01-23 NOTE — ED NOTES
Mepilex dressings placed on inner part of knees, bilateral hips, and sacrum area to prevent pressure injury. Blanchable Redness noted to left hip    Soiled brief removed, sona care performed, new brief placed. All extra linens and pads removed from under patient. Patient provided with warm blankets. Primary RN in attendance during the above.

## 2023-01-23 NOTE — PROGRESS NOTES
Transition of Care Plan: SNF/LTC-resides at [facility]  *therapy to evaluate to determine what level of care may be needed    RUR: 10% low    PCP F/U: Carrie Espinoza MD    Disposition: SNF/LTC-resides at [facility]    Transportation: [facility]    Main Contact: Grandson: Ed Lara: [phone]: Telephone call to patient's grandson, Barry Cedillo. Introduced self and role of CM. States he is the legal next of kin. States patient's spouse is no longer living and he does not have any living children or siblings. CM has asked grandson to bring POA paperwork. Notified grandson of IMM letter. Requests that it be sent to: [email or web address]. Patient has been residing at [facility] and [address] [place] [address] since 2020. Therapy to evaluate to determine which level of care, LTC vs SNF, may be needed. Lynnette Zamudio RN, CRM    Medicare pt has received, reviewed, and signed 1st IM letter informing them of their right to appeal the discharge. Signed copy has been placed on pt bedside chart.     Residency:  [facility]   Lives With: other residents  Prior functioning:  LTC staff assists with ADLs  Prior DME used: wheelchair  Rehab history: none  Pharmacy: LTC pharmacy    Reason for Admission: SAH, Ischemic stroke, ground level fall                    RUR Score:  10% low                   Plan for utilizing home health:  therapy to evaluate-likely SNF or LTC        PCP: First and Last name:  Carrie Espinoza MD     Name of Practice:    Are you a current patient: Yes/No: yes   Approximate date of last visit:    Can you participate in a virtual visit with your PCP:                     Current Advanced Directive/Advance Care Plan: Full Code    Healthcare Decision Maker:   Click here to complete [address] including selection of the [address] Relationship (ie \"Primary\")             Primary Decision MakerElmitchell Deb - [phone] Transition of Care Plan: SNF/LTC-resides at [facility] and [address] [place] [address] Management Interventions  PCP Verified by CM:  Yes  Mode of Transport at Discharge: BLS  Physical Therapy Consult: Yes  Occupational Therapy Consult: Yes  Speech Therapy Consult: Yes  Support Systems: Other Family Member(s)  Confirm Follow Up Transport: Other (see comment) (BLS)  Discharge Location  Patient Expects to be Discharged to[de-identified] Skilled nursing facility

## 2023-01-23 NOTE — ED NOTES
Report attempted again at this time, floor stated \"they are at max capacity and dayshift will take report\".

## 2023-01-23 NOTE — H&P
[address] [facility] Adult  Hospitalist Group  History and Physical    Date of Service:  1/23/2023  Primary Care Provider: Uday Farley MD  Source of information: Chart review    Chief Complaint: Fall, Laceration, and Hip Pain      History of Presenting Illness:   Alicja Cardona is a 80 y.o. male who presents with fall. Patient [facility] from [facility] after presumed ground-level fall. He was found on the ground, unknown downtime, noted to have a posterior scalp injury/laceration,  was seen at [facility] ER and was transferred to [facility], currently resting in bed, complains of a headache           REVIEW OF SYSTEMS:  Review of systems not obtained due to patient factors.   Dementia    Past Medical History:   Diagnosis Date    Acid reflux     Allergic contact dermatitis due to other chemical products     Allergic rhinitis     Alzheimer's disease with late onset (CODE) ([address]     Aneurysm of aorta (HCC)     Angina pectoris ([address]     Aortic stenosis 10/06/2019    Atherosclerotic heart disease of native coronary artery without angina pectoris     Cellulitis     Chronic kidney disease, stage 3 (HCC)     Chronic obstructive pulmonary disease (HCC)     Coronary artery disease     Dementia (HCC)     Depression     Diabetes mellitus ([address]     Dry eye syndrome of bilateral lacrimal glands     Elevated cholesterol     Generalized anxiety disorder     GERD without esophagitis     Glaucoma     Glaucoma     High triglycerides     [place] (hard of hearing)     Hyperlipemia     Hypertension     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    Ill-defined condition     some memory loss    Muscle wasting and atrophy, not elsewhere classified, left shoulder     Obstructive sleep apnea     Pain, unspecified     Personal history of COVID-19     Restless legs syndrome     Unspecified sleep apnea     C PAP    Urethral stricture       Past Surgical History:   Procedure Laterality Date    COLONOSCOPY  11/3/2014 COLONOSCOPY N/A 2/1/2017    COLONOSCOPY cold bx polypectomy and heated polypectomy and clipping performed by Jay Jay Michael MD at [facility] [address] REPAIR  12/13/2006 Dr Ratna Gutierrez  2002    HX TURP  2004     Prior to Admission medications    Medication Sig Start Date End Date Taking? Authorizing Provider   acetaminophen (TYLENOL) 325 mg tablet Take  by mouth every four (4) hours as needed for Pain. Yes Other, MD Linda   aspirin 81 mg chewable tablet Take 81 mg by mouth daily. Yes Other, MD Linda   atorvastatin (LIPITOR) 20 mg tablet Take  by mouth daily. Yes Other, MD Linda   vitamin D3-vitamin K2, MK4, 1,000-100 unit-mcg tab Take  by mouth. Yes Neris, MD Linda   simethicone (Gas Relief, simethicone,) 80 mg chewable tablet Take 80 mg by mouth every six (6) hours as needed for Flatulence. Yes Other, MD Linda   glucagon (Glucagon Emergency Kit, human,) 1 mg solr injection by IntraVENous route once. Yes Other, MD Linda   umeclidinium (Incruse Ellipta) 62.5 mcg/actuation inhaler Take 1 Puff by inhalation daily. Yes Other, MD Linda   insulin lispro (HUMALOG) 100 unit/mL kwikpen by SubCUTAneous route. Yes Other, MD Linda   mirtazapine (REMERON) 7.5 mg tablet Take 7.5 mg by mouth nightly. Yes Other, MD Linda   ranolazine ER (RANEXA) 500 mg SR tablet Take  by mouth two (2) times a day. Yes Other, MD Linda   mineral oil/petrolatum,white (REFRESH LACRI-LUBE OP) Apply  to eye. Yes Other, MD Linda   insulin glargine-yfgn (Semglee,insulin glarg-yfgn,Pen) 100 unit/mL (3 mL) inpn by SubCUTAneous route. Yes Other, MD Linda   memantine (NAMENDA) 10 mg tablet Take 10 mg by mouth two (2) times a day. Provider, Historical   montelukast (SINGULAIR) 10 mg tablet Take 10 mg by mouth daily. Provider, Historical   metoprolol tartrate (LOPRESSOR) 25 mg tablet Take 0.5 Tabs by mouth every twelve (12) hours.   Patient taking differently: Take  by mouth nightly. 6/17/17   Richy Zavaleta MD   donepezil (ARICEPT) 5 mg tablet Take 10 mg by mouth daily. Provider, Historical   timolol (TIMOPTIC OCUDOSE) 0.5 % Dpet Administer 1 Drop to both eyes two (2) times a day. Provider, Historical   sertraline (ZOLOFT) 100 mg tablet Take 150 mg by mouth nightly. Provider, Historical   latanoprost (XALATAN) 0.005 % ophthalmic solution Administer 1 Drop to both eyes nightly. Provider, Historical   rOPINIRole (REQUIP) 0.25 mg tablet Take  by mouth three (3) times daily. Provider, Historical     Allergies   Allergen Reactions    Morphine Rash and Itching     mild    Tuberculin Julia-Ppd Swelling      History reviewed. No pertinent family history. Social History:  reports that he quit smoking about 48 years ago. His smoking use included cigarettes. He has a 24.00 pack-year smoking history. He has never used smokeless tobacco. He reports current alcohol use. He reports that he does not use drugs. Social Determinants of Health     Tobacco Use: Medium Risk    Smoking Tobacco Use: Former    Smokeless Tobacco Use: Never    Passive Exposure: Not on file   Alcohol Use: Not on file   Financial Resource Strain: Not on file   Food Insecurity: Not on file   Transportation Needs: Not on file   Physical Activity: Not on file   Stress: Not on file   Social Connections: Not on file   Intimate Partner Violence: Not on file   Depression: Not on file   Housing Stability: Not on file        Family and social history were personally reviewed, all pertinent and relevant details are outlined as above.     Objective:   Visit Vitals  /77   Pulse 66   Temp 97.8 °F (36.6 °C)   Resp 15   Wt 73 kg (160 lb 15 oz)   SpO2 96%   BMI 21.83 kg/m²      O2 Device: None (Room air)    PHYSICAL EXAM:   General: Awake, confused  HEENT: PEERL, moist mucus membranes  Neck: Supple, no JVD, no meningeal signs  Chest: Shallow respirations, decreased basal breath sounds  CVS: RRR, S1 S2 heard, no murmurs/rubs/gallops  Abd: Soft, non-tender, non-distended, +bowel sounds   Ext: No clubbing, no cyanosis, no edema  Neuro/Psych: Very limited exam, moves all 4 but strength could not be tested sensory including nerves could not be tested  Cap refill: Brisk, less than 3 seconds  Pulses: 2+, symmetric in all extremities  Skin: Warm, dry, without rashes or lesions    Data Review: All diagnostic labs and studies have been reviewed. Abnormal Labs Reviewed   CBC WITH AUTOMATED DIFF - Abnormal; Notable for the following components:       Result Value    IMMATURE GRANULOCYTES 1 (*)     ABS. IMM. GRANS. 0.1 (*)     All other components within normal limits   METABOLIC PANEL, COMPREHENSIVE - Abnormal; Notable for the following components:    Sodium 146 (*)     Chloride 109 (*)     BUN 22 (*)     Creatinine 1.40 (*)     eGFR 48 (*)     AST (SGOT) 14 (*)     Albumin 3.0 (*)     A-G Ratio 0.8 (*)     All other components within normal limits   GLUCOSE, POC - Abnormal; Notable for the following components:    Glucose (POC) 38 (*)     All other components within normal limits   GLUCOSE, POC - Abnormal; Notable for the following components:    Glucose (POC) 36 (*)     All other components within normal limits   GLUCOSE, POC - Abnormal; Notable for the following components:    Glucose (POC) 31 (*)     All other components within normal limits       All Micro Results       Procedure Component Value Units Date/Time    URINE CULTURE HOLD SAMPLE [[id number]]     Order Status: Canceled Specimen: Urine             IMAGING:   XR PELV AP ONLY   Final Result   No acute fracture or dislocation. CT HEAD WO CONT   Final Result   Acute ischemia of the left occipital lobe is suspected, possible trace   superimposed subarachnoid hemorrhage. Confirmation with MRI of the brain recommended. Imaging findings consistent with moderate chronic microvascular ischemic change. There is a moderate degree of cerebral atrophy.              MRI BRAIN WO CONT    (Results Pending)   CT HEAD WO CONT    (Results Pending)        ECG/ECHO:    Results for orders placed or performed during the hospital encounter of 01/22/23   EKG, 12 LEAD, INITIAL   Result Value Ref Range    Ventricular Rate 58 BPM    Atrial Rate 58 BPM    P-R Interval 200 ms    QRS Duration 188 ms    Q-T Interval 512 ms    QTC Calculation (Bezet) 502 ms    Calculated P Axis 72 degrees    Calculated R Axis -75 degrees    Calculated T Axis 108 degrees    Diagnosis       Sinus bradycardia  Left axis deviation  Nonspecific intraventricular block  Lateral infarct , age undetermined  Abnormal ECG  No previous ECGs available          Assessment:   Given the patient's current clinical presentation, there is a high level of concern for decompensation if discharged from the emergency department. Complex decision making was performed, which includes reviewing the patient's available past medical records, laboratory results, and imaging studies. Acute CVA  Severe hypoglycemia  ?   Minimal subarachnoid hemorrhage  Metabolic encephalopathy  Fall  COPD  Dementia  Diabetes mellitus type 2  Hypertension    Plan:     Patient will be admitted on telemetry bed, appreciate neurosurgery input, no obvious signs of SAH, will repeat a CT and MRI of the brain, I tried calling the patient's wife but could not connect with her, start patient on low-dose aspirin, neurology consult, neurovascular checks, pain control, statin, PT OT speech consult, aspiration precautions and further intervention per hospital course  Hypoglycemic protocol, glucagon and start patient on D5 gtt., Accu-Cheks every hour, once blood glucose stabilizes, hold all oral hypoglycemics, close monitoring  Neurosurgery feels no SAH on imaging, repeat CT and MRI pending, neurovascular checks, close monitoring  Likely multifactorial, treat underlying issues, check UA, treat hypoglycemia and further intervention per hospital course  Fall precautions  DuoNebs as needed  Hold oral hypoglycemics, sliding scale of insulin, Accu-Cheks, diet control  Optimize blood pressure control        DIET: ADULT DIET Regular; 4 carb choices (60 gm/meal); Low Fat/Low Chol/High Fiber/2 gm Na; No Salt Added (3-4 gm); Low Potassium (Less than 3000 mg/day)   ISOLATION PRECAUTIONS: There are currently no Active Isolations  CODE STATUS: Full Code   DVT PROPHYLAXIS: SCDs  FUNCTIONAL STATUS PRIOR TO HOSPITALIZATION: Capable of only limited self-care; confined to bed or chair likely more than 50% of waking hours. Ambulatory status/function: Ambulates with assistance:  Walker   EARLY MOBILITY ASSESSMENT: Recommend an assessment from physical therapy and/or occupational therapy  ANTICIPATED DISCHARGE: Greater than 48 hours. ANTICIPATED DISPOSITION: [facility]    CRITICAL CARE WAS PERFORMED FOR THIS ENCOUNTER: YES. I had a face to face encounter with the patient, reviewed and interpreted patient data including clinical events, labs, images, vital signs, I/O's, and examined patient. I have discussed the case and the plan and management of the patient's care with the consulting services, the bedside nurses and necessary ancillary providers. This patient has a high probability of imminent, clinically significant deterioration, which requires the highest level of preparedness to intervene urgently. I participated in the decision-making and personally managed or directed the management of the following life and organ supporting interventions that required my frequent assessment to treat or prevent imminent deterioration. I personally spent 40 minutes of critical care time. This is time spent at this critically ill patient's bedside actively involved in patient care as well as the coordination of care and discussions with the patient's family. This does not include any procedural time which has been billed separately.       Signed By: Deysi Almanzar MD     January 23, 2023 Please note that this dictation may have been completed with Dragon, the computer voice recognition software. Quite often unanticipated grammatical, syntax, homophones, and other interpretive errors are inadvertently transcribed by the computer software. Please disregard these errors. Please excuse any errors that have escaped final proofreading.

## 2023-01-23 NOTE — PROGRESS NOTES
Renal Dosing/Monitoring  Medication: Famotidine   Current regimen:  20 mg PO every 12 hr  Recent Labs     01/22/23  2341   CREA 1.40*   BUN 22*     Estimated CrCl:  37.5 ml/min  Plan: Change to famotidine 20 mg PO every 24 hours per [facility] P&T Committee Protocol with respect to renal function (CrCl <50 ml/min). Pharmacy will continue to monitor patient daily and will make dosage adjustments based upon changing renal function.

## 2023-01-23 NOTE — ED PROVIDER NOTES
History of hypertension, diabetes, hyperlipidemia, dementia, GERD, aortic aneurysm, COPD, coronary disease, depression, sleep apnea. He presents via EMS from a nursing home. He is unable to provide any history. Per nursing, he was found on the ground at the nursing home. He was noted to have a posterior scalp injury/bleeding. He apparently complained of left hip pain. Past Medical History:   Diagnosis Date    Acid reflux     Aneurysm of aorta (HCC)     Aortic stenosis 10/6/2019    Chronic obstructive pulmonary disease (HCC)     Coronary artery disease     Dementia (HCC)     Depression     Diabetes mellitus (HCC)     Elevated cholesterol     Glaucoma     Glaucoma     High triglycerides     [place] (hard of hearing)     Hypertension     Iliac aneurysm ([facility] 10/6/2019    Ill-defined condition     some memory loss    Unspecified sleep apnea     C PAP    Urethral stricture        Past Surgical History:   Procedure Laterality Date    COLONOSCOPY  11/3/2014         COLONOSCOPY N/A 2017    COLONOSCOPY cold bx polypectomy and heated polypectomy and clipping performed by Lucy Martínez MD at [place] [id number] AAA REPAIR  2006 Dr [facility] [place] APPENDECTOMY      [address]  2002    HX TURP  2004         History reviewed. No pertinent family history.     Social History     Socioeconomic History    Marital status:      Spouse name: Not on file    Number of children: Not on file    Years of education: Not on file    Highest education level: Not on file   Occupational History    Not on file   Tobacco Use    Smoking status: Former     Packs/day: 2.00     Years: 12.00     Pack years: 24.00     Types: Cigarettes     Quit date: 1975     Years since quittin.0    Smokeless tobacco: Never   Substance and Sexual Activity    Alcohol use: Yes     Comment: very moderately    Drug use: No    Sexual activity: Never     Partners: Female   Other Topics Concern    Not on file   Social History Narrative    Not on file     Social Determinants of Health     Financial Resource Strain: Not on file   Food Insecurity: Not on file   Transportation Needs: Not on file   Physical Activity: Not on file   Stress: Not on file   Social Connections: Not on file   Intimate Partner Violence: Not on file   Housing Stability: Not on file         ALLERGIES: Morphine and Tuberculin beatrice-ppd    Review of Systems   Unable to perform ROS: Dementia     Vitals:    01/22/23 2300   BP: 92/61   Pulse: 66   Resp: 16   Temp: 97.3 °F (36.3 °C)   SpO2: 100%   Weight: 73 kg (160 lb 15 oz)            Physical Exam  Vitals and nursing note reviewed. Constitutional:       Appearance: He is well-developed. Comments: Advanced dementia. Appears in no distress. HENT:      Head: Normocephalic and atraumatic. Comments: Posterior scalp abrasion noted with some surrounding dried blood. Eyes:      Conjunctiva/sclera: Conjunctivae normal.   Neck:      Trachea: No tracheal deviation. Cardiovascular:      Rate and Rhythm: Normal rate and regular rhythm. Heart sounds: Normal heart sounds. No murmur heard. No friction rub. No gallop. Pulmonary:      Effort: Pulmonary effort is normal.      Breath sounds: Normal breath sounds. Abdominal:      Palpations: Abdomen is soft. Tenderness: There is no abdominal tenderness. Musculoskeletal:         General: No deformity. Cervical back: Neck supple. Comments: Patient resists passive range of motion of his hips. No significant tenderness (or pain noted with movement). Skin:     General: Skin is warm and dry. Neurological:      Comments: oriented        Medical Decision Making  Amount and/or Complexity of Data Reviewed  Labs: ordered. Radiology: ordered. ECG/medicine tests: ordered. Risk  Prescription drug management. Decision regarding hospitalization. Procedures    EKG: Sinus bradycardia; rate of 56; left axis deviation; left bundle branch block. Noni Aj MD  11:25 PM    [facility] Consult for Admission  12:29 AM    ED Room Number: SER08/08  Patient Name and age:  Bailey Harper 80 y.o.  male  Working Diagnosis: Found on the ground at his nursing home/abnormal CT    COVID-19 Suspicion:  no  Sepsis present:  no  Reassessment needed: Other  Code Status:  Full Code  Readmission: no  Isolation Requirements:  no  Recommended Level of Care:  telemetry  Department:[facility] - [phone]  Other: Found on the floor at the nursing home after an apparent unwitnessed fall. He has advanced dementia and is unable to provide any history. CT head shows:  Acute ischemia of the left occipital lobe is suspected, possible trace  superimposed subarachnoid hemorrhage. Confirmation with MRI of the brain recommended. Imaging findings consistent with moderate chronic microvascular ischemic change. There is a moderate degree of cerebral atrophy. Consult note: I reached out to the hospitalist service via [facility] for admission. Noni Aj MD  12:33 AM      Consult note: Dr. Neli Aleman (neurosurgery) -he does not see a subarachnoid hemorrhage on his review of the CT scan.   Noni Aj MD  1:19 AM

## 2023-01-23 NOTE — ED TRIAGE NOTES
Patient arrives from [facility] via [address] with complaint of: \"found on floor\" \"Left Hip Pain\" per EMS. Laceration noted to back of patients head.

## 2023-01-23 NOTE — ED NOTES
Report given to [facility] from [facility], [facility] ETA is 8:45. Bedside and Verbal shift change report given to Coleman Walker (oncoming nurse) by Ambreen Wade (offgoing nurse). Report included the following information SBAR, ED Summary, MAR, Recent Results, and Cardiac Rhythm . Fransisco Tucker

## 2023-01-23 NOTE — CONSULTS
NEUROLOGY CONSULT NOTE    Name Ravi Ruvalcaba Age 80 y.o. MRN [id number]  4/3/1934     Consulting Physician: Ruddy Jain MD      Chief Complaint:  found down     Assessment:     Active Problems:    SAH (subarachnoid hemorrhage) ([address] (2023)      Ischemic stroke ([address] (2023)      Fall from ground level (2023)      80year-old male with history of Alzheimars, AS, HTN, HLD, DM, CAD s/p PCI 2016, COPD, and CKD who was found down at [facility] and presented to [facility] yesterday. CT head showed 23 shows concern for L occipital hypodensity with concern for \"trace superimposed SAH\". Repeat CT today showed no change. Neurosurgery evaluated and there was no hemorrhage on review. Exam: [place] A&O x2 not year but some confusion at baseline (thinks wife is managing meds but reported ). FC x4 with no focal weakness. Concern for ischemic versus acute hypoglycemia causing syncope versus acute hypotension in setting of AS. Recommendations:   - send lipid panel  - send HgbA1C  - Obtain MRI w/o contrast to eval for acute stroke  - Obtain TTE given found down  - ok to start aspirin 81 mg   - atorvastatin 40 mg daily for now    We will follow-up with test results tomorrow. Please contact us with any questions. History of Present Illness:      Ravi Ruvalcaba is an 80 y.o L-handed male with history of Alzheimers, AAA s/p repair, HTN, HLD, DM, CAD with PCI  , COPD, CKD, angina pectoris who presented to [facility] on 23 from SNF after being found on the ground, unknown downtime, and noted to have a posterior scalp laceration. He has had multiple falls in the past. He is currently on aspirin 81 mg at baseline previously on Plavix for stents but off since  and takes atorvastatin. CT head wo contrast 23 showed no traumatic bleed with evidence acute ischemia in left occipital lobe. Patient [facility] for higher level of care.  Repeat CT head wo contrast on 23 showed no significant changes. Patient is confused at baseline and unknown if there was LOC, weakness, headache, visual changes, palpitations or SOB. BG was noted to be 76 when he arrived here from [address] [place] [address]. BG was noted to be 36 here today. We are asked to consult on the patient for concern for stroke versus TIA. Allergies   Allergen Reactions    Morphine Rash and Itching     mild    Tuberculin Julia-Ppd Swelling        Prior to Admission medications    Medication Sig Start Date End Date Taking? Authorizing Provider   acetaminophen (TYLENOL) 325 mg tablet Take  by mouth every four (4) hours as needed for Pain. Yes Other, MD Linda   aspirin 81 mg chewable tablet Take 81 mg by mouth daily. Yes Neris, MD Linda   atorvastatin (LIPITOR) 20 mg tablet Take  by mouth daily. Yes Neris, MD Linda   vitamin D3-vitamin K2, MK4, 1,000-100 unit-mcg tab Take  by mouth. Yes Neris, MD Linda   simethicone (Gas Relief, simethicone,) 80 mg chewable tablet Take 80 mg by mouth every six (6) hours as needed for Flatulence. Yes Neris, MD Linda   glucagon (Glucagon Emergency Kit, human,) 1 mg solr injection by IntraVENous route once. Yes Neris, MD Linda   umeclidinium (Incruse Ellipta) 62.5 mcg/actuation inhaler Take 1 Puff by inhalation daily. Yes Neris, MD Linda   insulin lispro (HUMALOG) 100 unit/mL kwikpen by SubCUTAneous route. Yes Neris, MD Linda   mirtazapine (REMERON) 7.5 mg tablet Take 7.5 mg by mouth nightly. Yes Neris, MD Linda   ranolazine ER (RANEXA) 500 mg SR tablet Take  by mouth two (2) times a day. Yes Neris, MD Linda   mineral oil/petrolatum,white (REFRESH LACRI-LUBE OP) Apply  to eye. Yes Neris, MD Linda   insulin glargine-yfgn (Semglee,insulin glarg-yfgn,Pen) 100 unit/mL (3 mL) inpn by SubCUTAneous route. Yes Other, MD Linda   memantine (NAMENDA) 10 mg tablet Take 10 mg by mouth two (2) times a day. Provider, Marita   montelukast (SINGULAIR) 10 mg tablet Take 10 mg by mouth daily.     Provider, Historical   metoprolol tartrate (LOPRESSOR) 25 mg tablet Take 0.5 Tabs by mouth every twelve (12) hours. Patient taking differently: Take  by mouth nightly. 6/17/17   Alvaro Whitten MD   donepezil (ARICEPT) 5 mg tablet Take 10 mg by mouth daily. Provider, Historical   timolol (TIMOPTIC OCUDOSE) 0.5 % Dpet Administer 1 Drop to both eyes two (2) times a day. Provider, Historical   sertraline (ZOLOFT) 100 mg tablet Take 150 mg by mouth nightly. Provider, Historical   latanoprost (XALATAN) 0.005 % ophthalmic solution Administer 1 Drop to both eyes nightly. Provider, Historical   rOPINIRole (REQUIP) 0.25 mg tablet Take  by mouth three (3) times daily.       Provider, Historical       Past Medical History:   Diagnosis Date    Acid reflux     Allergic contact dermatitis due to other chemical products     Allergic rhinitis     Alzheimer's disease with late onset (CODE) ([address]     Aneurysm of aorta (HCC)     Angina pectoris ([address]     Aortic stenosis 10/06/2019    Atherosclerotic heart disease of native coronary artery without angina pectoris     Cellulitis     Chronic kidney disease, stage 3 (HCC)     Chronic obstructive pulmonary disease (HCC)     Coronary artery disease     Dementia (HCC)     Depression     Diabetes mellitus ([address]     Dry eye syndrome of bilateral lacrimal glands     Elevated cholesterol     Generalized anxiety disorder     GERD without esophagitis     Glaucoma     Glaucoma     High triglycerides     [place] (hard of hearing)     Hyperlipemia     Hypertension     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    Ill-defined condition     some memory loss    Muscle wasting and atrophy, not elsewhere classified, left shoulder     Obstructive sleep apnea     Pain, unspecified     Personal history of COVID-19     Restless legs syndrome     Unspecified sleep apnea     C PAP    Urethral stricture         Past Surgical History:   Procedure Laterality Date    COLONOSCOPY  11/3/2014         COLONOSCOPY N/A 2/1/2017 COLONOSCOPY cold bx polypectomy and heated polypectomy and clipping performed by Taryn Francisco MD at [facility] AAA REPAIR  2006 Dr Brandon Ramon APPENDECTOMY      [address]  2002    HX TURP  2004        Social History     Tobacco Use    Smoking status: Former     Packs/day: 2.00     Years: 12.00     Pack years: 24.00     Types: Cigarettes     Quit date: 1975     Years since quittin.0    Smokeless tobacco: Never   Substance Use Topics    Alcohol use: Yes     Comment: very moderately        History reviewed. No pertinent family history. Review of Systems:   Comprehensive review of systems performed and negative except for as listed above. Exam:     Visit Vitals  /83   Pulse 65   Temp 97.8 °F (36.6 °C)   Resp 17   Ht 6' (1.829 m)   Wt 72.6 kg (160 lb)   SpO2 100%   BMI 21.70 kg/m²        General: Well developed, well nourished. Patient in no apparent distress   Head: Normocephalic, atraumatic, anicteric sclera   Lungs:  Clear to auscultation bilaterally, No wheezes or rubs   Cardiac: Regular rate and rhythm with no murmurs. Abd: Bowel sounds were audible. No tenderness on palpation   Ext: No pedal edema   Skin: No overt signs of rash     Neurological Exam:  Mental Status: A&Ox2, not year   Speech: Fluent no aphasia or dysarthria. Cranial Nerves:   VFF. Facial sensation is normal. Facial movement is symmetric. Palate is midline. Normal sternocleidomastoid strength. Tongue is midline. Hearing is intact bilaterally. Eyes: PERRL, EOM's full, no nystagmus, no ptosis. Motor:  FC x4 4/5. Effort dependent   Reflexes:   DTR 2+/4 and symmetrical.  Toes downgoing bilat. Sensory:   SILT bilat throughout   Gait:  Deferred   Tremor:   Slight tremor noted to RUE   Cerebellar:  FTN intact. Neurovascular: No carotid bruits. Imaging  CT Results (maximum last 3):   Results from [place] encounter on 23    CT HEAD WO CONT    Narrative  EXAM: CT HEAD WO CONT    INDICATION: cva    COMPARISON: 1/22/2023. CONTRAST: None. TECHNIQUE: Unenhanced CT of the head was performed using 5 mm images. Brain and  bone windows were generated. Coronal and sagittal reformats. CT dose reduction  was achieved through use of a standardized protocol tailored for this  examination and automatic exposure control for dose modulation. FINDINGS:  The ventricles and sulci are normal in size, shape and configuration. . Chronic  encephalomalacia left frontal lobe and right frontal parietal region. Hypodensity in the left occipital lobe corresponding to possible acute ischemia  noted on previous study unchanged. There is a small focal hyperdensitya within  the left occipital lobe which may represent small amount of hemorrhage  unchanged. . . The basilar cisterns are open. No CT evidence of acute infarct. The bone windows demonstrate no abnormalities. The visualized portions of the  paranasal sinuses and mastoid air cells are clear. Impression  No acute intracranial abnormality. CT HEAD WO CONT    Narrative  CLINICAL HISTORY: Fall/head injury  INDICATION: Fall/head injury  COMPARISON: None. CT dose reduction was achieved through use of a standardized protocol tailored  for this examination and automatic exposure control for dose modulation. TECHNIQUE: Serial axial images with a collimation of 5 mm were obtained from the  skull base through the vertex  FINDINGS:    Acute ischemia of the left occipital lobe is suspected, possible trace  superimposed subarachnoid hemorrhage. There is sulcal and ventricular prominence. Confluent periventricular and  scattered foci of hypodensity in the cerebral white matter. There is no evidence  of an acute infarction, hemorrhage, or mass-effect. There is no evidence of  midline shift or hydrocephalus. Posterior fossa structures are unremarkable. No  extra-axial collections are seen.   Mastoid air cells are well pneumatized and clear. Chronic ischemic encephalomalacia of the left frontal lobe and chronic ischemic  encephalomalacia of the right frontal and right parietal lobe. Impression  Acute ischemia of the left occipital lobe is suspected, possible trace  superimposed subarachnoid hemorrhage. Confirmation with MRI of the brain recommended. Imaging findings consistent with moderate chronic microvascular ischemic change. There is a moderate degree of cerebral atrophy. Results from [place] encounter on 11/20/17    CT ABD PELV W CONT    Narrative  EXAM: CT ABD PELV W CONT    INDICATION: epigastric pain, dizziness, lactic acidosis, eval for bowel ischemia      TECHNIQUE: Axial CT scan of the abdomen and pelvis with   IV contrast  including coronal and sagittal reconstructions. COMPARISON: 6/23/2009    FINDINGS:  Lower thorax: No acute findings    ABDOMEN:  Liver: Unremarkable. No mass. Gallbladder and bile ducts: Unremarkable. No calcified stones. No ductal  dilatation. Pancreas: Unremarkable. No ductal dilatation. No mass. Spleen: Unremarkable. No splenomegaly. Adrenals: Unremarkable. No mass. Kidneys and ureters: Bilateral renal cysts. Atrophic left kidney. Appendix: Appendectomy. Stomach and bowel: Stomach unremarkable. No obstruction. No mucosal thickening. No inflammatory changes. Multiple colonic diverticula without diverticulitis. Peritoneum: Unremarkable. No significant fluid collection. No free air. Lymph nodes: Unremarkable. No enlarged lymph nodes. Vasculature: Suprarenal abdominal aortic aneurysm extending to the bifurcation  14 cm in length measuring 4.2 cm in diameter. Bones: No acute fracture. Abdominal wall: Unremarkable. No evidence of a hernia. PELVIS:  Bladder: Unremarkable. No mass. Reproductive: Unremarkable as visualized. Impression  IMPRESSION:    1. Bilateral renal cysts and atrophic left kidney. 2. Abdominal aortic aneurysm 4.2 cm in diameter.   3. Multiple colonic diverticula without diverticulitis. MRI Results (maximum last 3): No results found for this or any previous visit.       Lab Review  Lab Results   Component Value Date/Time    WBC 9.6 01/23/2023 03:53 PM    HCT 49.1 01/23/2023 03:53 PM    HGB 14.8 01/23/2023 03:53 PM    PLATELET 927 61/32/1838 03:53 PM     Lab Results   Component Value Date/Time    Sodium 146 (H) 01/22/2023 11:41 PM    Potassium 3.9 01/22/2023 11:41 PM    Chloride 109 (H) 01/22/2023 11:41 PM    CO2 30 01/22/2023 11:41 PM    Glucose 76 01/22/2023 11:41 PM    BUN 22 (H) 01/22/2023 11:41 PM    Creatinine 1.40 (H) 01/22/2023 11:41 PM    Calcium 8.8 01/22/2023 11:41 PM       Signed:  NING Navas  1/23/2023  4:19 PM

## 2023-01-23 NOTE — ED NOTES
[facility] [place], states can not take report at this time.

## 2023-01-23 NOTE — CONSULTS
Neurosurgery    Called for CT with \"possible trace subarachnoid hemorrhage\"     I do not see any.  If there is any, then it is clinically insignificant    Please call if needed

## 2023-01-24 ENCOUNTER — APPOINTMENT (OUTPATIENT)
Dept: CT IMAGING | Age: 88
End: 2023-01-24
Attending: NURSE PRACTITIONER
Payer: MEDICARE

## 2023-01-24 ENCOUNTER — APPOINTMENT (OUTPATIENT)
Dept: MRI IMAGING | Age: 88
End: 2023-01-24
Attending: FAMILY MEDICINE
Payer: MEDICARE

## 2023-01-24 LAB
ANION GAP SERPL CALC-SCNC: 3 MMOL/L (ref 5–15)
ATRIAL RATE: 56 BPM
BUN SERPL-MCNC: 18 MG/DL (ref 6–20)
BUN/CREAT SERPL: 15 (ref 12–20)
CALCIUM SERPL-MCNC: 8.5 MG/DL (ref 8.5–10.1)
CALCULATED P AXIS, ECG09: 59 DEGREES
CALCULATED R AXIS, ECG10: -87 DEGREES
CALCULATED T AXIS, ECG11: 100 DEGREES
CHLORIDE SERPL-SCNC: 107 MMOL/L (ref 97–108)
CHOLEST SERPL-MCNC: 143 MG/DL
CO2 SERPL-SCNC: 28 MMOL/L (ref 21–32)
CREAT SERPL-MCNC: 1.23 MG/DL (ref 0.7–1.3)
CRP SERPL HS-MCNC: 6.8 MG/L
DIAGNOSIS, 93000: NORMAL
ECHO PULMONARY ARTERY SYSTOLIC PRESSURE (PASP): 40 MMHG
ERYTHROCYTE [DISTWIDTH] IN BLOOD BY AUTOMATED COUNT: 13.3 % (ref 11.5–14.5)
ERYTHROCYTE [SEDIMENTATION RATE] IN BLOOD: 17 MM/HR (ref 0–20)
EST. AVERAGE GLUCOSE BLD GHB EST-MCNC: 148 MG/DL
GLUCOSE BLD STRIP.AUTO-MCNC: 109 MG/DL (ref 65–117)
GLUCOSE BLD STRIP.AUTO-MCNC: 119 MG/DL (ref 65–117)
GLUCOSE BLD STRIP.AUTO-MCNC: 88 MG/DL (ref 65–117)
GLUCOSE SERPL-MCNC: 124 MG/DL (ref 65–100)
HBA1C MFR BLD: 6.8 % (ref 4–5.6)
HCT VFR BLD AUTO: 44 % (ref 36.6–50.3)
HDLC SERPL-MCNC: 40 MG/DL
HDLC SERPL: 3.6 (ref 0–5)
HGB BLD-MCNC: 14.1 G/DL (ref 12.1–17)
LDLC SERPL CALC-MCNC: 75 MG/DL (ref 0–100)
MCH RBC QN AUTO: 29.3 PG (ref 26–34)
MCHC RBC AUTO-ENTMCNC: 32 G/DL (ref 30–36.5)
MCV RBC AUTO: 91.5 FL (ref 80–99)
NRBC # BLD: 0 K/UL (ref 0–0.01)
NRBC BLD-RTO: 0 PER 100 WBC
P-R INTERVAL, ECG05: 186 MS
PLATELET # BLD AUTO: 300 K/UL (ref 150–400)
PMV BLD AUTO: 11.4 FL (ref 8.9–12.9)
POTASSIUM SERPL-SCNC: 4.3 MMOL/L (ref 3.5–5.1)
Q-T INTERVAL, ECG07: 518 MS
QRS DURATION, ECG06: 192 MS
QTC CALCULATION (BEZET), ECG08: 499 MS
RBC # BLD AUTO: 4.81 M/UL (ref 4.1–5.7)
SERVICE CMNT-IMP: ABNORMAL
SERVICE CMNT-IMP: NORMAL
SERVICE CMNT-IMP: NORMAL
SODIUM SERPL-SCNC: 138 MMOL/L (ref 136–145)
TRIGL SERPL-MCNC: 140 MG/DL (ref ?–150)
TSH SERPL DL<=0.05 MIU/L-ACNC: 4.63 UIU/ML (ref 0.36–3.74)
VENTRICULAR RATE, ECG03: 56 BPM
VLDLC SERPL CALC-MCNC: 28 MG/DL
WBC # BLD AUTO: 9.7 K/UL (ref 4.1–11.1)

## 2023-01-24 PROCEDURE — 97530 THERAPEUTIC ACTIVITIES: CPT

## 2023-01-24 PROCEDURE — 85027 COMPLETE CBC AUTOMATED: CPT

## 2023-01-24 PROCEDURE — 82962 GLUCOSE BLOOD TEST: CPT

## 2023-01-24 PROCEDURE — 97535 SELF CARE MNGMENT TRAINING: CPT

## 2023-01-24 PROCEDURE — 74011250637 HC RX REV CODE- 250/637: Performed by: FAMILY MEDICINE

## 2023-01-24 PROCEDURE — 95816 EEG AWAKE AND DROWSY: CPT | Performed by: PSYCHIATRY & NEUROLOGY

## 2023-01-24 PROCEDURE — 97161 PT EVAL LOW COMPLEX 20 MIN: CPT

## 2023-01-24 PROCEDURE — 70551 MRI BRAIN STEM W/O DYE: CPT

## 2023-01-24 PROCEDURE — 70496 CT ANGIOGRAPHY HEAD: CPT

## 2023-01-24 PROCEDURE — 74011000636 HC RX REV CODE- 636: Performed by: HOSPITALIST

## 2023-01-24 PROCEDURE — 97165 OT EVAL LOW COMPLEX 30 MIN: CPT

## 2023-01-24 PROCEDURE — 83036 HEMOGLOBIN GLYCOSYLATED A1C: CPT

## 2023-01-24 PROCEDURE — 84443 ASSAY THYROID STIM HORMONE: CPT

## 2023-01-24 PROCEDURE — 70450 CT HEAD/BRAIN W/O DYE: CPT

## 2023-01-24 PROCEDURE — 80061 LIPID PANEL: CPT

## 2023-01-24 PROCEDURE — 99223 1ST HOSP IP/OBS HIGH 75: CPT | Performed by: SPECIALIST

## 2023-01-24 PROCEDURE — 99233 SBSQ HOSP IP/OBS HIGH 50: CPT | Performed by: NURSE PRACTITIONER

## 2023-01-24 PROCEDURE — 86141 C-REACTIVE PROTEIN HS: CPT

## 2023-01-24 PROCEDURE — 4A03X5D MEASUREMENT OF ARTERIAL FLOW, INTRACRANIAL, EXTERNAL APPROACH: ICD-10-PCS | Performed by: RADIOLOGY

## 2023-01-24 PROCEDURE — 85652 RBC SED RATE AUTOMATED: CPT

## 2023-01-24 PROCEDURE — 74011000250 HC RX REV CODE- 250: Performed by: FAMILY MEDICINE

## 2023-01-24 PROCEDURE — 74011000250 HC RX REV CODE- 250: Performed by: EMERGENCY MEDICINE

## 2023-01-24 PROCEDURE — 65270000046 HC RM TELEMETRY

## 2023-01-24 PROCEDURE — 80048 BASIC METABOLIC PNL TOTAL CA: CPT

## 2023-01-24 PROCEDURE — 36415 COLL VENOUS BLD VENIPUNCTURE: CPT

## 2023-01-24 RX ORDER — RANOLAZINE 500 MG/1
500 TABLET, EXTENDED RELEASE ORAL 2 TIMES DAILY
Status: DISCONTINUED | OUTPATIENT
Start: 2023-01-24 | End: 2023-01-26 | Stop reason: HOSPADM

## 2023-01-24 RX ORDER — DONEPEZIL HYDROCHLORIDE 10 MG/1
10 TABLET, FILM COATED ORAL DAILY
Status: DISCONTINUED | OUTPATIENT
Start: 2023-01-24 | End: 2023-01-26 | Stop reason: HOSPADM

## 2023-01-24 RX ORDER — MONTELUKAST SODIUM 10 MG/1
10 TABLET ORAL DAILY
Status: DISCONTINUED | OUTPATIENT
Start: 2023-01-24 | End: 2023-01-26 | Stop reason: HOSPADM

## 2023-01-24 RX ORDER — METOPROLOL TARTRATE 25 MG/1
12.5 TABLET, FILM COATED ORAL EVERY 12 HOURS
Status: DISCONTINUED | OUTPATIENT
Start: 2023-01-24 | End: 2023-01-26 | Stop reason: HOSPADM

## 2023-01-24 RX ORDER — TIMOLOL MALEATE 5 MG/ML
1 SOLUTION/ DROPS OPHTHALMIC EVERY 12 HOURS
Status: DISCONTINUED | OUTPATIENT
Start: 2023-01-24 | End: 2023-01-26 | Stop reason: HOSPADM

## 2023-01-24 RX ORDER — MIRTAZAPINE 15 MG/1
7.5 TABLET, FILM COATED ORAL
Status: DISCONTINUED | OUTPATIENT
Start: 2023-01-24 | End: 2023-01-26 | Stop reason: HOSPADM

## 2023-01-24 RX ORDER — LATANOPROST 50 UG/ML
1 SOLUTION/ DROPS OPHTHALMIC
Status: DISCONTINUED | OUTPATIENT
Start: 2023-01-24 | End: 2023-01-26 | Stop reason: HOSPADM

## 2023-01-24 RX ORDER — SERTRALINE HYDROCHLORIDE 50 MG/1
150 TABLET, FILM COATED ORAL
Status: DISCONTINUED | OUTPATIENT
Start: 2023-01-24 | End: 2023-01-26 | Stop reason: HOSPADM

## 2023-01-24 RX ORDER — MEMANTINE HYDROCHLORIDE 5 MG/1
10 TABLET ORAL 2 TIMES DAILY
Status: DISCONTINUED | OUTPATIENT
Start: 2023-01-24 | End: 2023-01-26 | Stop reason: HOSPADM

## 2023-01-24 RX ADMIN — FAMOTIDINE 20 MG: 20 TABLET, FILM COATED ORAL at 09:14

## 2023-01-24 RX ADMIN — SERTRALINE 150 MG: 50 TABLET, FILM COATED ORAL at 21:21

## 2023-01-24 RX ADMIN — RANOLAZINE 500 MG: 500 TABLET, FILM COATED, EXTENDED RELEASE ORAL at 09:15

## 2023-01-24 RX ADMIN — MIRTAZAPINE 7.5 MG: 15 TABLET, FILM COATED ORAL at 21:20

## 2023-01-24 RX ADMIN — METOPROLOL TARTRATE 12.5 MG: 25 TABLET, FILM COATED ORAL at 09:15

## 2023-01-24 RX ADMIN — SERTRALINE 150 MG: 50 TABLET, FILM COATED ORAL at 02:03

## 2023-01-24 RX ADMIN — DEXTROSE AND SODIUM CHLORIDE 75 ML/HR: 5; 450 INJECTION, SOLUTION INTRAVENOUS at 02:07

## 2023-01-24 RX ADMIN — DONEPEZIL HYDROCHLORIDE 10 MG: 10 TABLET, FILM COATED ORAL at 09:15

## 2023-01-24 RX ADMIN — MEMANTINE 10 MG: 5 TABLET ORAL at 09:15

## 2023-01-24 RX ADMIN — SODIUM CHLORIDE, PRESERVATIVE FREE 10 ML: 5 INJECTION INTRAVENOUS at 14:00

## 2023-01-24 RX ADMIN — MIRTAZAPINE 7.5 MG: 15 TABLET, FILM COATED ORAL at 02:02

## 2023-01-24 RX ADMIN — MEMANTINE 10 MG: 5 TABLET ORAL at 17:06

## 2023-01-24 RX ADMIN — IOPAMIDOL 100 ML: 755 INJECTION, SOLUTION INTRAVENOUS at 14:57

## 2023-01-24 RX ADMIN — SODIUM CHLORIDE, PRESERVATIVE FREE 10 ML: 5 INJECTION INTRAVENOUS at 21:21

## 2023-01-24 RX ADMIN — LATANOPROST 1 DROP: 50 SOLUTION OPHTHALMIC at 01:59

## 2023-01-24 RX ADMIN — METOPROLOL TARTRATE 12.5 MG: 25 TABLET, FILM COATED ORAL at 02:04

## 2023-01-24 RX ADMIN — MONTELUKAST 10 MG: 10 TABLET, FILM COATED ORAL at 09:14

## 2023-01-24 RX ADMIN — ATORVASTATIN CALCIUM 40 MG: 40 TABLET, FILM COATED ORAL at 21:20

## 2023-01-24 RX ADMIN — LATANOPROST 1 DROP: 50 SOLUTION OPHTHALMIC at 21:21

## 2023-01-24 RX ADMIN — RANOLAZINE 500 MG: 500 TABLET, FILM COATED, EXTENDED RELEASE ORAL at 21:55

## 2023-01-24 RX ADMIN — ASPIRIN 81 MG: 81 TABLET, CHEWABLE ORAL at 09:14

## 2023-01-24 RX ADMIN — TIMOLOL MALEATE 1 DROP: 5 SOLUTION OPHTHALMIC at 21:21

## 2023-01-24 RX ADMIN — TIMOLOL MALEATE 1 DROP: 5 SOLUTION OPHTHALMIC at 09:21

## 2023-01-24 NOTE — PROGRESS NOTES
Orders received, chart reviewed and patient evaluated by physical therapy. Pending progression with skilled acute physical therapy, recommend:  Therapy up to 5 days/week in SNF setting    Recommend with nursing OOB to chair 3x/day and walking daily with one assist and walker and gait belt . Thank you for completing as able in order to maintain patient strength, endurance and independence. 01/24/23 1000 01/24/23 1003 01/24/23 1009   Vital Signs   Pulse (Heart Rate) 75 80 89   Level of Consciousness  --   --   --    /79 92/76 (!) 82/72   MAP (Calculated) 86 81 75   BP 1 Location Left upper arm Left upper arm Left upper arm   BP 1 Method Automatic Automatic Automatic   BP Patient Position Semi fowlers; At rest Sitting Standing   Resp Rate 15  --   --       01/24/23 1014 01/24/23 1019   Vital Signs   Pulse (Heart Rate) 78 67   Level of Consciousness 0  --    BP 96/81 103/70   MAP (Calculated) 86 81   BP 1 Location Left upper arm Left upper arm   BP 1 Method Automatic Automatic   BP Patient Position Sitting  (in recliner) Reclining   Resp Rate  --   --      Patient noted to be orthostatic during treatment. BP remained low but returned to baseline. Full evaluation to follow.       Thank you for your consideration,    Lucien Chandra, PT, DPT

## 2023-01-24 NOTE — PROGRESS NOTES
Problem: Mobility Impaired (Adult and Pediatric)  Goal: *Acute Goals and Plan of Care (Insert Text)  Description:   FUNCTIONAL STATUS PRIOR TO ADMISSION: Patient is a poor historian due to cognitive status. Unclear PLOF. He reports using a RW. HOME SUPPORT PRIOR TO ADMISSION: Patient resided in [address] [place] [address] since 2020. Physical Therapy Goals  Initiated 1/24/2023  1. Patient will move from supine to sit and sit to supine , scoot up and down, and roll side to side in bed with supervision/set-up within 7 day(s). 2.  Patient will transfer from bed to chair and chair to bed with supervision/set-up using the least restrictive device within 7 day(s). 3.  Patient will perform sit to stand with supervision/set-up within 7 day(s). 4.  Patient will ambulate with supervision/set-up for 50 feet with the least restrictive device within 7 day(s). 5.  Patient will improve Martinez Balance score by 7 points within 7 days. Outcome: Progressing Towards Goal     PHYSICAL THERAPY EVALUATION- NEURO POPULATION  Patient: Gaye Burleson (29 y.o. male)  Date: 1/24/2023  Primary Diagnosis: Ischemic stroke ([facility] [I63.9]  Fall from ground level [W18.30XA]  SAH (subarachnoid hemorrhage) ([facility] [I60.9]       Precautions: fall         ASSESSMENT  Based on the objective data described below, the patient presents with significant hearing loss, orthostatic hypotension, confusion, oriented to self only, decreased generalized strength, poor standing balance, decreased mobility including poor gait pattern, increased need for assistance. This is an 80year old male who presents from LTC facility after a GLF with L hip pain. X-ray of pelvis is negative. Has minimal complaints of L hip pain and LBP during session. He has a dx of alzheimer's dementia. MRI showed chronic R frontal, parietal, L frontal, occipital, R basal ganglia infarcts. Patient is received in bed agreeable to treatment, pleasant and motivated to participate.  Patient noted to have hypotension throughout however mostly asymptomatic until standing. When standing, required constant UE support, noted to lack full knee extension bilaterally. He is able to SPT from bed to chair while ambulating 5ft with RW. He requires min to mod A for this transition, noted to get consistently weaker as he reaches chair. Some safety awareness deficits are present, requiring extra assistance to reach chair safely. Patient is educated on fall risk and is sitting on chair alarm. RN notified of session. Unclear PLOF however patient demonstrates significant deficits and would benefit from SNF for rehab at discharge. Will continue to follow. 01/24/23 1000 01/24/23 1003 01/24/23 1009   Vital Signs   Pulse (Heart Rate) 75 80 89   Level of Consciousness  --   --   --    /79 92/76 (!) 82/72   MAP (Calculated) 86 81 75   BP 1 Location Left upper arm Left upper arm Left upper arm   BP 1 Method Automatic Automatic Automatic   BP Patient Position Semi fowlers; At rest Sitting Standing   Resp Rate 15  --   --       01/24/23 1014 01/24/23 1019   Vital Signs   Pulse (Heart Rate) 78 67   Level of Consciousness 0  --    BP 96/81 103/70   MAP (Calculated) 86 81   BP 1 Location Left upper arm Left upper arm   BP 1 Method Automatic Automatic   BP Patient Position Sitting  (in recliner) Reclining   Resp Rate  --   --        Current Level of Function Impacting Discharge (mobility/balance): bed mobility SBA, transfers CGA to mod A, gait training min A for 5 ft with RW, constant support at RW when standing    Functional Outcome Measure: The patient scored Total: 5/56 on the [facility] Assessment which is indicative of high fall risk. Other factors to consider for discharge: resides in LTC      Patient will benefit from skilled therapy intervention to address the above noted impairments.        PLAN :  Recommendations and Planned Interventions: bed mobility training, transfer training, gait training, therapeutic exercises, neuromuscular re-education, patient and family training/education, and therapeutic activities      Frequency/Duration: Patient will be followed by physical therapy:  5 times a week to address goals. Recommendation for discharge: (in order for the patient to meet his/her long term goals)  Therapy up to 5 days/week in SNF setting    This discharge recommendation:  Has been made in collaboration with the attending provider and/or case management    IF patient discharges home will need the following DME: to be determined (TBD)         SUBJECTIVE:   Patient stated Do whatever you need to do ladies!     OBJECTIVE DATA SUMMARY:   HISTORY:    Past Medical History:   Diagnosis Date    Acid reflux     Allergic contact dermatitis due to other chemical products     Allergic rhinitis     Alzheimer's disease with late onset (CODE) ([facility]     Aneurysm of aorta (HCC)     Angina pectoris ([address]     Aortic stenosis 10/06/2019    Atherosclerotic heart disease of native coronary artery without angina pectoris     Cellulitis     Chronic kidney disease, stage 3 (HCC)     Chronic obstructive pulmonary disease (HCC)     Coronary artery disease     Dementia (HCC)     Depression     Diabetes mellitus ([address]     Dry eye syndrome of bilateral lacrimal glands     Elevated cholesterol     Generalized anxiety disorder     GERD without esophagitis     Glaucoma     Glaucoma     High triglycerides     [place] (hard of hearing)     Hyperlipemia     Hypertension     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    Ill-defined condition     some memory loss    Muscle wasting and atrophy, not elsewhere classified, left shoulder     Obstructive sleep apnea     Pain, unspecified     Personal history of COVID-19     Restless legs syndrome     Unspecified sleep apnea     C PAP    Urethral stricture      Past Surgical History:   Procedure Laterality Date    COLONOSCOPY  11/3/2014         COLONOSCOPY N/A 2/1/2017    COLONOSCOPY cold bx polypectomy and heated polypectomy and clipping performed by Bethel Dickson MD at [facility] AAA REPAIR  12/13/2006 Dr Mariam Gallo  2002    HX TURP  2004       Personal factors and/or comorbidities impacting plan of care: extensive PMH including dementia, recent GLF, agreeable to treatment    Home Situation  Home Environment: Long term care  # Steps to Enter: 0  Living Alone: No  Support Systems: Other Family Member(s), [place], [place]  Patient Expects to be Discharged to[de-identified] Skilled nursing facility  Current DME Used/Available at Home: jessica Bhardwaj    EXAMINATION/PRESENTATION/DECISION MAKING:   Critical Behavior:  Neurologic State: Alert  Orientation Level: Disoriented to place, Disoriented to situation, Oriented to person, Disoriented to time  Cognition: Follows commands, Poor safety awareness       Range Of Motion:  AROM: Generally decreased, functional           PROM: Within functional limits           Strength:    Strength: Generally decreased, functional                    Tone & Sensation:   Tone: Normal              Sensation: Intact               Coordination:  Coordination: Generally decreased, functional  Vision:      Functional Mobility:  Bed Mobility:  Rolling: Stand-by assistance  Supine to Sit: Stand-by assistance        Transfers:  Sit to Stand: Contact guard assistance;Minimum assistance  Stand to Sit: Contact guard assistance;Minimum assistance        Bed to Chair: Minimum assistance; Moderate assistance              Balance:   Sitting: Intact  Standing: Impaired; With support  Standing - Static: Constant support; Fair  Standing - Dynamic : Constant support;Fair;Poor  Ambulation/Gait Training:  Distance (ft): 5 Feet (ft)  Assistive Device: Walker, rolling;Gait belt  Ambulation - Level of Assistance: Minimal assistance;Assist x1        Gait Abnormalities: Decreased step clearance;Trunk sway increased; Shuffling gait        Base of Support: Center of gravity altered  Stance: Weight shift  Speed/[place]: Pace decreased (<100 feet/min); Shuffled  Step Length: Left shortened;Right shortened         Functional Measure  Martinez Balance Test:    Sitting to Standin  Standing Unsupported: 0  Sitting with Back Unsupported: 3  Standing to Sittin  Transfers: 1  Standing Unsupported with Eyes Closed: 0  Standing Unsupported with Feet Together: 0  Reach Forward with Outstretched Arm: 0   Object: 0  Turn to Look Over Shoulders: 0  Turn 360 Degrees: 0  Alternate Foot on Step/Stool: 0  Standing Unsupported One Foot in Front: 0  Stand on One Le  Total:          56=Maximum possible score;   0-20=High fall risk  21-40=Moderate fall risk   41-56=Low fall risk        Physical Therapy Evaluation Charge Determination   History Examination Presentation Decision-Making   HIGH Complexity :3+ comorbidities / personal factors will impact the outcome/ POC  LOW Complexity : 1-2 Standardized tests and measures addressing body structure, function, activity limitation and / or participation in recreation  LOW Complexity : Stable, uncomplicated  LOW Complexity : FOTO score of       Based on the above components, the patient evaluation is determined to be of the following complexity level: LOW     Pain Rating:  Does not rate pain    Activity Tolerance:   Fair, requires rest breaks, and signs and symptoms of orthostatic hypotension      After treatment patient left in no apparent distress:   Sitting in chair, Call bell within reach, and Bed / chair alarm activated    COMMUNICATION/EDUCATION:   The patients plan of care was discussed with: Occupational therapist and Registered nurse. Fall prevention education was provided and the patient/caregiver indicated understanding. and Patient/family have participated as able in goal setting and plan of care.     Thank you for this referral.  Jama Graff, PT   Time Calculation: 28 mins

## 2023-01-24 NOTE — PROGRESS NOTES
Problem: Self Care Deficits Care Plan (Adult)  Goal: *Acute Goals and Plan of Care (Insert Text)  Description: FUNCTIONAL STATUS PRIOR TO ADMISSION: Patient is a poor historian, hx of dementia. Admitted from [facility] where he reports he was ambulatory with a RW, infer assist for ADLs/IADLs. 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 Patient will perform grooming at the sink with minimal assistance/contact guard assist within 7 day(s). 2.  Patient will perform bathing with minimal assistance/contact guard assist within 7 day(s). 3.  Patient will perform lower body dressing with minimal assistance/contact guard assist within 7 day(s). 4.  Patient will perform toilet transfers with minimal assistance/contact guard assist within 7 day(s). 5.  Patient will perform all aspects of toileting with minimal assistance/contact guard assist within 7 day(s). 6.  Patient will participate in upper extremity therapeutic exercise/activities with supervision/set-up for 10 minutes within 7 day(s). 7.  Patient will utilize energy conservation techniques during functional activities with verbal, tactile, and visual cues within 7 day(s). Outcome: Not Met     OCCUPATIONAL THERAPY EVALUATION  Patient: Liseth Eugene (70 y.o. male)  Date: 1/24/2023  Primary Diagnosis: Ischemic stroke [facility]) [I63.9]  Fall from ground level [W18.30XA]  SAH (subarachnoid hemorrhage) ([facility] [address] [I60.9]       Precautions: Fall       ASSESSMENT  Based on the objective data described below, the patient presents with decreased standing balance & tolerance, safety awareness, activity tolerance d/t hypotension, coordination, strength, and cognition (orientation, memory, insight, safety, judgment, problem solving) s/p admission for GLF, MRI- for acute process however noting numerous chronic bilateral infarcts.  PLOF information limited given patient's baseline dementia, admitted [facility], reports he was ambulatory with a RW, infer assist for ADLs/IADLs, extremely [place]. He now requires Min-Mod A for OOB ADLs and mobility with RW support. Mod cues required for safety and coordination with all tasks, frequent repeating of instructions d/t [place]. BPs labile to start, decreasing with standing trials, donning brief with Mod A, and transfer to the chair with Min-Mod A, improving once seated and BLEs elevated (see below). Given his current function, recommend d/c to SNF given anticipated functional decline. Vitals:     01/24/23 1000 01/24/23 1003 01/24/23 1009 01/24/23 1014 01/24/23 1019   BP: 101/79 92/76 (!) 82/72 96/81 103/70   BP 1 Location: Left upper arm Left upper arm Left upper arm Left upper arm Left upper arm   BP Patient Position: Semi addison's; At rest Sitting Standing Sitting  Comment: in recliner Reclining   Pulse: 75 80 89 78 67      Current Level of Function Impacting Discharge (ADLs/self-care): Min-Mod A for ADLs and OOB mobility with RW    Functional Outcome Measure: The patient scored Total: 45/100 on the Barthel Index outcome measure which is indicative of being partially dependent in basic self-care. Other factors to consider for discharge: fall risk, PMH, PLOF     Patient will benefit from skilled therapy intervention to address the above noted impairments. PLAN :  Recommendations and Planned Interventions: self care training, functional mobility training, therapeutic exercise, balance training, therapeutic activities, cognitive retraining, endurance activities, patient education, home safety training, and family training/education    Frequency/Duration: Patient will be followed by occupational therapy 4 times a week to address goals.     Recommendation for discharge: (in order for the patient to meet his/her long term goals)  Therapy up to 5 days/week in SNF setting    This discharge recommendation:  Has been made in collaboration with the attending provider and/or case management    IF patient discharges home will need the following DME: To be determined (TBD)         SUBJECTIVE:   Patient stated Whatever you need to do, that's fine.     OBJECTIVE DATA SUMMARY:   HISTORY:   Past Medical History:   Diagnosis Date    Acid reflux     Allergic contact dermatitis due to other chemical products     Allergic rhinitis     Alzheimer's disease with late onset (CODE) ([facility] [address]     Aneurysm of aorta (HCC)     Angina pectoris ([facility] [address]     Aortic stenosis 10/06/2019    Atherosclerotic heart disease of native coronary artery without angina pectoris     Cellulitis     Chronic kidney disease, stage 3 (HCC)     Chronic obstructive pulmonary disease (HCC)     Coronary artery disease     Dementia (HCC)     Depression     Diabetes mellitus (HCC)     Dry eye syndrome of bilateral lacrimal glands     Elevated cholesterol     Generalized anxiety disorder     GERD without esophagitis     Glaucoma     Glaucoma     High triglycerides     [place] (hard of hearing)     Hyperlipemia     Hypertension     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    Ill-defined condition     some memory loss    Muscle wasting and atrophy, not elsewhere classified, left shoulder     Obstructive sleep apnea     Pain, unspecified     Personal history of COVID-19     Restless legs syndrome     Unspecified sleep apnea     C PAP    Urethral stricture      Past Surgical History:   Procedure Laterality Date    COLONOSCOPY  11/3/2014         COLONOSCOPY N/A 2/1/2017    COLONOSCOPY cold bx polypectomy and heated polypectomy and clipping performed by Em Franco MD at [place] [address] [facility] REPAIR  12/13/2006 Dr Geoffrey Carrizales APPENDECTOMY      [address]  2002    HX TURP  2004       Expanded or extensive additional review of patient history:     Home Situation  Home Environment: Long term care  # Steps to Enter: 0  Living Alone: No  Support Systems: Other Family Member(s), [facility], Skilled Nursing Facility  Patient Expects to be Discharged to[de-identified] Skilled nursing facility  Current DME Used/Available at Home: Walker, rolling    Hand dominance: Right    EXAMINATION OF PERFORMANCE DEFICITS:  Cognitive/Behavioral Status:  Neurologic State: Alert  Orientation Level: Oriented to person;Disoriented to place; Disoriented to situation;Disoriented to time (no recall with attempts/cues)  Cognition: Decreased attention/concentration; Follows commands;Memory loss;Poor safety awareness  Perception: Appears intact  Perseveration: No perseveration noted  Safety/Judgement: Decreased awareness of environment;Decreased awareness of need for assistance;Decreased awareness of need for safety;Decreased insight into deficits    Skin: Appears intact with the exception of R cranial laceration (posterior)    Edema: None    Hearing: Auditory  Auditory Impairment: Hard of hearing, bilateral    Vision/Perceptual:    Tracking: Able to track stimulus in all quadrants w/o difficulty                 Diplopia: No              Range of Motion:  AROM: Generally decreased, functional  PROM: Within functional limits                      Strength:  Strength: Generally decreased, functional                Coordination:  Coordination: Generally decreased, functional  Fine Motor Skills-Upper: Left Intact; Right Intact    Gross Motor Skills-Upper: Left Intact; Right Intact    Tone & Sensation:  Tone: Normal  Sensation: Intact                      Balance:  Sitting: Intact  Standing: Impaired; With support (RW)  Standing - Static: Constant support; Fair  Standing - Dynamic : Constant support;Fair;Poor    Functional Mobility and Transfers for ADLs:  Bed Mobility:  Rolling: Stand-by assistance  Supine to Sit: Stand-by assistance  Sit to Supine:  (in recliner)  Scooting: Stand-by assistance    Transfers:  Sit to Stand: Contact guard assistance;Minimum assistance  Stand to Sit: Contact guard assistance;Minimum assistance  Bed to Chair: Minimum assistance; Moderate assistance  Toilet Transfer : Moderate assistance; Adaptive equipment (infer to/from [facility] per mobiltiy & activity tolerance)  Assistive Device : Gait Belt;Walker, rolling    ADL Assessment:  Feeding: Setup    Oral Facial Hygiene/Grooming: Minimum assistance (setup seated with cues for task completion, increased assist standing at the sink')    Bathing: Moderate assistance         Upper Body Dressing: Setup    Lower Body Dressing: Moderate assistance    Toileting: Moderate assistance                ADL Intervention and task modifications:  Lower Body Dressing Assistance  Dressing Assistance: Moderate assistance  Protective Undergarmet: Moderate assistance  Socks: Contact guard assistance (adjusting EOB via tailor sit)  Leg Crossed Method Used: Yes  Position Performed: Seated edge of bed;Standing  Cues: Physical assistance; Tactile cues provided;Visual cues provided;Verbal cues provided         Cognitive Retraining  Safety/Judgement: Decreased awareness of environment;Decreased awareness of need for assistance;Decreased awareness of need for safety;Decreased insight into deficits    Functional Measure:    Barthel Index:  Bathin  Bladder: 5  Bowels: 5  Groomin  Dressin  Feeding: 10  Mobility: 0  Stairs: 0  Toilet Use: 5  Transfer (Bed to Chair and Back): 10  Total: 45/100      The Barthel ADL Index: Guidelines  1. The index should be used as a record of what a patient does, not as a record of what a patient could do. 2. The main aim is to establish degree of independence from any help, physical or verbal, however minor and for whatever reason. 3. The need for supervision renders the patient not independent. 4. A patient's performance should be established using the best available evidence. Asking the patient, friends/relatives and nurses are the usual sources, but direct observation and common sense are also important. However direct testing is not needed.   5. Usually the patient's performance over the preceding 24-48 hours is important, but occasionally longer periods will be relevant. 6. Middle categories imply that the patient supplies over 50 per cent of the effort. 7. Use of aids to be independent is allowed. Score Interpretation (from [facility])    Independent   60-79 Minimally independent   40-59 Partially dependent   20-39 Very dependent   <20 Totally dependent     -Skyler Carrillo., Barthel, D.W. (1965). Functional evaluation: the Barthel Index. [address] ([address] (1997). The Barthel activities of daily living index: self-reporting versus actual performance in the old (> or = 75 years). Journal of [address] [place] [address] 45(7), [facility], FATOU, Lexis Shrestha., Memo Trivedi. (1999). Measuring the change in disability after inpatient rehabilitation; comparison of the responsiveness of the Barthel Index and Functional [place] Measure. Journal of Neurology, Neurosurgery, and Psychiatry, 66(4), 251-390. Shay Delgadillo N.[place].ALBERTO, MAHENDRA Lucio, & Shannon Carroll, M.A. (2004) Assessment of post-stroke quality of life in cost-effectiveness studies: The usefulness of the Barthel Index and the EuroQoL-5D.  Quality of Life Research, 15, 786-20     Occupational Therapy Evaluation Charge Determination   History Examination Decision-Making   LOW Complexity : Brief history review  MEDIUM Complexity : 3-5 performance deficits relating to physical, cognitive , or psychosocial skils that result in activity limitations and / or participation restrictions LOW Complexity : No comorbidities that affect functional and no verbal or physical assistance needed to complete eval tasks       Based on the above components, the patient evaluation is determined to be of the following complexity level: LOW   Pain Rating:  None    Activity Tolerance:   Fair, requires rest breaks, and hypotensive    After treatment patient left in no apparent distress:    Sitting in chair, Call bell within reach, and Bed / chair alarm activated    COMMUNICATION/EDUCATION:   The patients plan of care was discussed with: Physical therapist and Registered nurse. Home safety education was provided and the patient/caregiver indicated understanding., Patient/family have participated as able in goal setting and plan of care. , and Patient/family agree to work toward stated goals and plan of care. This patients plan of care is appropriate for delegation to [facility].     Thank you for this referral.  Elpidio Hashimoto, OTD, OTR/L  Time Calculation: 26 mins

## 2023-01-24 NOTE — PROGRESS NOTES
[facility]  Hospitalist Group                                                                                          Hospitalist Progress Note  Zion Abreu MD  Answering service: [phone] from in house phone        Date of Service:  2023  NAME:  Nina Bey  :  4/3/1934  MRN:  [id number]      Admission Summary:     Nina Bey is a 80 y.o. male who presents with fall. Patient [facility] from [facility] after presumed ground-level fall. He was found on the ground, unknown downtime, noted to have a posterior scalp injury/laceration,  was seen at [facility] ER and was transferred to [facility], currently resting in bed, complains of a headache    Interval history / Subjective:     Patient is seen and examined. Patient is hard of hearing, patient said he feels better, no chest pain or shortness. Assessment & Plan:     Fall possible related to hypoglycemia   -MRI of the brain scattered chronic infarction right frontal parietal, left frontal, left occipital with chronic hemosiderin deposition associated. Small chronic infarct of the right basilar ganglia.   No intracranial mass, acute intracranial hemorrhage or evidence of acute infarction  -CT head on  acute ischemia of the left occipital lobe is suspected, possible trace superimposed subarachnoid hemorrhage  -lipid panel normal  -fall precaution   -PT/OT  -Orthostatic BP   -seen by neurosurgeon  -Neurologist on board    Severe hypoglycemia   -on D5 0.45 normal saline   -resolved, finger stick glucose  over night   -patient not on hypoglycemic agent   -monitor finger stick glucose     Elevated troponin with hx of CAD s/p PCI in 2016  -no left side chest pain   -possible demand ischemia   -on aspirin, lipitor,   -metoprolol on hold       COPD  -stable  -not bronchospastic     Alzheimer dementia   -stable  -continue home namenda,   T2DM  -A1c 6.8    Hx of AS  -stable    HTN  -BP soft, metoprolol on hold, monitor BP    HLD  -continue lipitor     Depression   -stable  -continue home zoloft, and aricept   -continue support        Code status: Full Code   Prophylaxis: SCD   Care Plan discussed with: Patient, Nurse and CM   Anticipated Disposition: [facility] Problems  Date Reviewed: 10/6/2019            Codes Class Noted POA    SAH (subarachnoid hemorrhage) ([facility] [address] ICD-10-CM: I60.9  ICD-9-CM: 019  1/23/2023 Unknown        Ischemic stroke [facility]) ICD-10-CM: I63.9  ICD-9-CM: 434.91  1/23/2023 Unknown        Fall from ground level ICD-10-CM: V16.50DR  ICD-9-CM: E888.9  1/23/2023 Unknown           Vital Signs:    Last 24hrs VS reviewed since prior progress note. Most recent are:  Visit Vitals  /78   Pulse 82   Temp 98.2 °F (36.8 °C)   Resp 18   Ht 6' (1.829 m)   Wt 72.6 kg (160 lb)   SpO2 91%   BMI 21.70 kg/m²         Intake/Output Summary (Last 24 hours) at 1/24/2023 1388  Last data filed at 1/24/2023 0413  Gross per 24 hour   Intake --   Output 300 ml   Net -300 ml        Physical Examination:     I had a face to face encounter with this patient and independently examined them on 1/24/2023 as outlined below:          Constitutional:  No acute distress, cooperative, pleasant    ENT:  Oral mucosa moist, oropharynx benign. Resp:  CTA bilaterally. No wheezing/rhonchi/rales. No accessory muscle use. CV:  Regular rhythm, normal rate, no murmurs, gallops, rubs    GI:  Soft, non distended, non tender. normoactive bowel sounds, no hepatosplenomegaly     Musculoskeletal:  No edema, warm, 2+ pulses throughout    Neurologic:  Conscious and alert, hard of hearing, oriented to place and person, Moves all extremities.  CN II-XII reviewed            Data Review:    Review and/or order of clinical lab test  Review and/or order of tests in the radiology section of CPT  Review and/or order of tests in the medicine section of CPT      Labs:     Recent Labs 01/24/23  0217 01/23/23  1553   WBC 9.7 9.6   HGB 14.1 14.8   HCT 44.0 49.1    304     Recent Labs     01/24/23  0217 01/23/23  1553 01/22/23  2341    139 146*   K 4.3 3.8 3.9    107 109*   CO2 28 27 30   BUN 18 17 22*   CREA 1.23 1.22 1.40*   * 128* 76   CA 8.5 8.6 8.8     Recent Labs     01/22/23  2341   ALT 14   AP 89   TBILI 0.3   TP 6.7   ALB 3.0*   GLOB 3.7     No results for input(s): INR, PTP, APTT, INREXT in the last 72 hours. No results for input(s): FE, TIBC, PSAT, FERR in the last 72 hours. No results found for: FOL, RBCF   No results for input(s): PH, PCO2, PO2 in the last 72 hours.   Recent Labs     01/23/23 2018 01/23/23  1553   CPK 54 78     Lab Results   Component Value Date/Time    Cholesterol, total 143 01/24/2023 02:17 AM    HDL Cholesterol 40 01/24/2023 02:17 AM    LDL, calculated 75 01/24/2023 02:17 AM    Triglyceride 140 01/24/2023 02:17 AM    CHOL/HDL Ratio 3.6 01/24/2023 02:17 AM     Lab Results   Component Value Date/Time    Glucose (POC) 109 01/24/2023 05:23 AM    Glucose (POC) 178 (H) 01/23/2023 11:50 PM    Glucose (POC) 130 (H) 01/23/2023 05:58 PM    Glucose (POC) 119 (H) 01/23/2023 04:40 PM    Glucose (POC) 76 01/23/2023 02:17 PM     Lab Results   Component Value Date/Time    Color Yellow 06/19/2018 12:31 PM    Appearance Clear 06/19/2018 12:31 PM    Specific gravity <=1.005 06/19/2018 12:31 PM    pH (UA) 5.0 06/19/2018 12:31 PM    Protein Negative 06/19/2018 12:31 PM    Glucose >=1000 (A) 06/19/2018 12:31 PM    Ketone Negative 06/19/2018 12:31 PM    Bilirubin Negative 06/19/2018 12:31 PM    Urobilinogen 0.2 06/19/2018 12:31 PM    Nitrites Negative 06/19/2018 12:31 PM    Leukocyte Esterase Negative 06/19/2018 12:31 PM         Medications Reviewed:     Current Facility-Administered Medications   Medication Dose Route Frequency    donepeziL (ARICEPT) tablet 10 mg  10 mg Oral DAILY    latanoprost (XALATAN) 0.005 % ophthalmic solution 1 Drop  1 Drop Both Eyes QHS    memantine (NAMENDA) tablet 10 mg  10 mg Oral BID    metoprolol tartrate (LOPRESSOR) tablet 12.5 mg  12.5 mg Oral Q12H    mirtazapine (REMERON) tablet 7.5 mg  7.5 mg Oral QHS    montelukast (SINGULAIR) tablet 10 mg  10 mg Oral DAILY    ranolazine ER (RANEXA) tablet 500 mg  500 mg Oral BID    sertraline (ZOLOFT) tablet 150 mg  150 mg Oral QHS    timolol (TIMOPTIC) 0.5 % ophthalmic solution 1 Drop  1 Drop Both Eyes Q12H    dextrose 5 % - 0.45% NaCl infusion  75 mL/hr IntraVENous CONTINUOUS    acetaminophen (TYLENOL) tablet 650 mg  650 mg Oral Q4H PRN    Or    acetaminophen (TYLENOL) solution 650 mg  650 mg Per NG tube Q4H PRN    Or    acetaminophen (TYLENOL) suppository 650 mg  650 mg Rectal Q4H PRN    aspirin chewable tablet 81 mg  81 mg Oral DAILY    atorvastatin (LIPITOR) tablet 40 mg  40 mg Oral QHS    glucose chewable tablet 16 g  4 Tablet Oral PRN    glucagon (GLUCAGEN) injection 1 mg  1 mg IntraMUSCular PRN    dextrose 10 % infusion 0-250 mL  0-250 mL IntraVENous PRN    insulin lispro (HUMALOG) injection   SubCUTAneous Q6H    famotidine (PEPCID) tablet 20 mg  20 mg Oral DAILY    sodium chloride (NS) flush 5-40 mL  5-40 mL IntraVENous Q8H    sodium chloride (NS) flush 5-40 mL  5-40 mL IntraVENous PRN     ______________________________________________________________________  EXPECTED LENGTH OF STAY: 4d 16h  ACTUAL LENGTH OF STAY:          1                 Luisana Chávez MD

## 2023-01-24 NOTE — PROGRESS NOTES
Transition of Care Plan: [facility]  *therapy to evaluate to determine what level of care may be needed     RUR: 11% low     PCP F/U: Brittany Lewis MD     Disposition: [facility]     Transportation: BLS     Main Contact: Yasmani: Pilar Cherry: [phone]    1114: Grandluna brought in advance directive paperwork. Placed in patient's chart.      Nidia Osullivan RN, CRM

## 2023-01-24 NOTE — PROGRESS NOTES
Occupational Therapy  01/24/23    Orders received, chart reviewed and patient evaluated by occupational therapy. Pending progression with skilled acute occupational therapy, recommend:  Therapy up to 5 days/week in SNF setting     Vitals:    01/24/23 1000 01/24/23 1003 01/24/23 1009 01/24/23 1014 01/24/23 1019   BP: 101/79 92/76 (!) 82/72 96/81 103/70   BP 1 Location: Left upper arm Left upper arm Left upper arm Left upper arm Left upper arm   BP Patient Position: Semi addison's; At rest Sitting Standing Sitting  Comment: in recliner Reclining   Pulse: 75 80 89 78 67       Recommend with nursing ADLs with assist, OOB to chair 3x/day and toileting via  BSC with 1 assist and walker. Thank you for completing as able in order to maintain patient strength, endurance and independence. Full evaluation to follow.      Thank you,   Oscar Tiwari, OTALISON, OTR/L

## 2023-01-24 NOTE — PROGRESS NOTES
Neurology Progress Note     NAME: Harvey Gandhi   :  4/3/1934   MRN:  [id number]   DATE:  2023    Assessment:     Active Problems:    SAH (subarachnoid hemorrhage) ([facility] (2023)      Ischemic stroke ([facility] [address] (2023)      Fall from ground level (2023)  Patient is an 80year-old L-handed male with history of Alzheimers disease, HTN, HLD, DM type 2, AS, CAD s/p PCI , COPD and CKD who was found down at his SNF [facility] and presented to [facility]. He was noted to have a posterior scalp laceration. CT head showed concern for L occipital hypodensity with concern for \"trace superimposed SAH\". Repeat CT yesterday showed no change. Neurosurgery evaluated and there was no hemorrhage on review. Patient states he was not taking aspirin at baseline (although listed on prior med list). MRI brain w/o contrast (23) shows scattered chronic infarctions R frontoparietal, R BG, L frontal and L occipital.   LDL 75, HgbA1C 6.8, TSH 4.63, troponin 2497, 1467  Unclear circumstances of patient being found down at SNF to include no record of if patient had any of the following: LOC, palpitations, SOB, dizziness, weakness, incontinence, or signs of seizure activity. Exam today: Very [place]. Oriented x1-2. Not time. Knows his age. Follows commands x4 5/5. Unclear still if LOC or mechanical fall. Plan:   1.) Found down:  - MRI negative for acute stroke  - Unclear presentation. Possible etiologies multifactorial to include hypoglycemia, orthostatic hypotension, NSTEMI/demand ischemia, versus seizures. - TTE pending  - Obtain routine EEG to eval for epilepsy  - Obtain CTA head/neck given multiple chronic infarcts  - continue aspirin 81 mg daily  - continue statin goal LDL <70 given prior strokes    Subjective:   Patient states \"Young lady, I am hard of hearing. \" Do you take aspirin? \"Back when I was in [facility], they told us not to take that. \"     Objective:   Chart reviewed since last seen    Current Facility-Administered Medications   Medication Dose Route Frequency    donepeziL (ARICEPT) tablet 10 mg  10 mg Oral DAILY    latanoprost (XALATAN) 0.005 % ophthalmic solution 1 Drop  1 Drop Both Eyes QHS    memantine (NAMENDA) tablet 10 mg  10 mg Oral BID    metoprolol tartrate (LOPRESSOR) tablet 12.5 mg  12.5 mg Oral Q12H    mirtazapine (REMERON) tablet 7.5 mg  7.5 mg Oral QHS    montelukast (SINGULAIR) tablet 10 mg  10 mg Oral DAILY    ranolazine ER (RANEXA) tablet 500 mg  500 mg Oral BID    sertraline (ZOLOFT) tablet 150 mg  150 mg Oral QHS    timolol (TIMOPTIC) 0.5 % ophthalmic solution 1 Drop  1 Drop Both Eyes Q12H    dextrose 5 % - 0.45% NaCl infusion  75 mL/hr IntraVENous CONTINUOUS    acetaminophen (TYLENOL) tablet 650 mg  650 mg Oral Q4H PRN    Or    acetaminophen (TYLENOL) solution 650 mg  650 mg Per NG tube Q4H PRN    Or    acetaminophen (TYLENOL) suppository 650 mg  650 mg Rectal Q4H PRN    aspirin chewable tablet 81 mg  81 mg Oral DAILY    atorvastatin (LIPITOR) tablet 40 mg  40 mg Oral QHS    glucose chewable tablet 16 g  4 Tablet Oral PRN    glucagon (GLUCAGEN) injection 1 mg  1 mg IntraMUSCular PRN    dextrose 10 % infusion 0-250 mL  0-250 mL IntraVENous PRN    insulin lispro (HUMALOG) injection   SubCUTAneous Q6H    famotidine (PEPCID) tablet 20 mg  20 mg Oral DAILY    sodium chloride (NS) flush 5-40 mL  5-40 mL IntraVENous Q8H    sodium chloride (NS) flush 5-40 mL  5-40 mL IntraVENous PRN       Visit Vitals  /70 (BP 1 Location: Left upper arm, BP Patient Position: Reclining)   Pulse 67   Temp 98.2 °F (36.8 °C)   Resp 15   Ht 6' (1.829 m)   Wt 72.6 kg (160 lb)   SpO2 91%   BMI 21.70 kg/m²     Temp (24hrs), Av.1 °F (36.7 °C), Min:97.4 °F (36.3 °C), Max:98.5 °F (36.9 °C)      No intake/output data recorded.   1901 -  0700  In: -   Out: 450 [Urine:450]      Physical Exam:  General: Well developed well nourished patient in no apparent distress. OOB to chair. Cardiac: Regular rate and rhythm with no murmurs. Extremities: 2+ Radial pulses, no cyanosis or edema    Neurological Exam:  Mental Status: A&O x1-2. Not time. States \"some kind of hospital\". Speech and language intact. Remote memory confusion. Cranial Nerves:   VFF, PERRL, EOMI, no nystagmus, no ptosis. Facial sensation is normal. Facial movement is symmetric. Palate is midline. Tongue is midline. Hearing is intact bilaterally. Motor:  FC 5/5 x4. No PD. No tremors   Reflexes: Toes downgoing. Sensory:   Intact to LT and PP   Gait:  Deferred   Cerebellar:  Deferred         Lab Review   Recent Results (from the past 24 hour(s))   GLUCOSE, POC    Collection Time: 01/23/23 10:57 AM   Result Value Ref Range    Glucose (POC) 31 (LL) 65 - 117 mg/dL    Performed by [employer], POC    Collection Time: 01/23/23 11:32 AM   Result Value Ref Range    Glucose (POC) 99 65 - 117 mg/dL    Performed by [employer], POC    Collection Time: 01/23/23  2:16 PM   Result Value Ref Range    Glucose (POC) 63 (L) 65 - 117 mg/dL    Performed by [employer], POC    Collection Time: 01/23/23  2:17 PM   Result Value Ref Range    Glucose (POC) 76 65 - 117 mg/dL    Performed by Tushar Jolly    CK    Collection Time: 01/23/23  3:53 PM   Result Value Ref Range    CK 78 39 - 308 U/L   TROPONIN-HIGH SENSITIVITY    Collection Time: 01/23/23  3:53 PM   Result Value Ref Range    Troponin-High Sensitivity 2,497 (HH) 0 - 76 ng/L   CBC WITH AUTOMATED DIFF    Collection Time: 01/23/23  3:53 PM   Result Value Ref Range    WBC 9.6 4.1 - 11.1 K/uL    RBC 5.25 4. 10 - 5.70 M/uL    HGB 14.8 12.1 - 17.0 g/dL    HCT 49.1 36.6 - 50.3 %    MCV 93.5 80.0 - 99.0 FL    MCH 28.2 26.0 - 34.0 PG    MCHC 30.1 30.0 - 36.5 g/dL    RDW 13.5 11.5 - 14.5 %    PLATELET 503 808 - 180 K/uL    MPV 11.3 8.9 - 12.9 FL    NRBC 0.0 0  WBC    ABSOLUTE NRBC 0.00 0.00 - 0.01 K/uL    NEUTROPHILS 67 32 - 75 %    LYMPHOCYTES 20 12 - 49 %    MONOCYTES 8 5 - 13 %    EOSINOPHILS 4 0 - 7 %    BASOPHILS 1 0 - 1 %    IMMATURE GRANULOCYTES 0 0.0 - 0.5 %    ABS. NEUTROPHILS 6.4 1.8 - 8.0 K/UL    ABS. LYMPHOCYTES 2.0 0.8 - 3.5 K/UL    ABS. MONOCYTES 0.8 0.0 - 1.0 K/UL    ABS. EOSINOPHILS 0.4 0.0 - 0.4 K/UL    ABS. BASOPHILS 0.1 0.0 - 0.1 K/UL    ABS. IMM.  GRANS. 0.0 0.00 - 0.04 K/UL    DF AUTOMATED     METABOLIC PANEL, BASIC    Collection Time: 01/23/23  3:53 PM   Result Value Ref Range    Sodium 139 136 - 145 mmol/L    Potassium 3.8 3.5 - 5.1 mmol/L    Chloride 107 97 - 108 mmol/L    CO2 27 21 - 32 mmol/L    Anion gap 5 5 - 15 mmol/L    Glucose 128 (H) 65 - 100 mg/dL    BUN 17 6 - 20 MG/DL    Creatinine 1.22 0.70 - 1.30 MG/DL    BUN/Creatinine ratio 14 12 - 20      eGFR 57 (L) >60 ml/min/1.73m2    Calcium 8.6 8.5 - 10.1 MG/DL   HEMOGLOBIN A1C WITH EAG    Collection Time: 01/23/23  3:53 PM   Result Value Ref Range    Hemoglobin A1c 6.6 (H) 4.0 - 5.6 %    Est. average glucose 143 mg/dL   GLUCOSE, POC    Collection Time: 01/23/23  4:40 PM   Result Value Ref Range    Glucose (POC) 119 (H) 65 - 117 mg/dL    Performed by [address] [place] [address], POC    Collection Time: 01/23/23  5:58 PM   Result Value Ref Range    Glucose (POC) 130 (H) 65 - 117 mg/dL    Performed by Marci Paula    CK    Collection Time: 01/23/23  8:18 PM   Result Value Ref Range    CK 54 39 - 308 U/L   TROPONIN-HIGH SENSITIVITY    Collection Time: 01/23/23  8:18 PM   Result Value Ref Range    Troponin-High Sensitivity 1,467 (HH) 0 - 76 ng/L   GLUCOSE, POC    Collection Time: 01/23/23 11:50 PM   Result Value Ref Range    Glucose (POC) 178 (H) 65 - 117 mg/dL    Performed by [address]    LIPID PANEL    Collection Time: 01/24/23  2:17 AM   Result Value Ref Range    Cholesterol, total 143 <200 MG/DL    Triglyceride 140 <150 MG/DL    HDL Cholesterol 40 MG/DL    LDL, calculated 75 0 - 100 MG/DL    VLDL, calculated 28 MG/DL    CHOL/HDL Ratio 3.6 0.0 - 5.0     HEMOGLOBIN A1C WITH EAG    Collection Time: 01/24/23  2:17 AM   Result Value Ref Range    Hemoglobin A1c 6.8 (H) 4.0 - 5.6 %    Est. average glucose 148 mg/dL   CBC W/O DIFF    Collection Time: 01/24/23  2:17 AM   Result Value Ref Range    WBC 9.7 4.1 - 11.1 K/uL    RBC 4.81 4.10 - 5.70 M/uL    HGB 14.1 12.1 - 17.0 g/dL    HCT 44.0 36.6 - 50.3 %    MCV 91.5 80.0 - 99.0 FL    MCH 29.3 26.0 - 34.0 PG    MCHC 32.0 30.0 - 36.5 g/dL    RDW 13.3 11.5 - 14.5 %    PLATELET 053 127 - 554 K/uL    MPV 11.4 8.9 - 12.9 FL    NRBC 0.0 0  WBC    ABSOLUTE NRBC 0.00 0.00 - 0.01 K/uL   SED RATE (ESR)    Collection Time: 01/24/23  2:17 AM   Result Value Ref Range    Sed rate, automated 17 0 - 20 mm/hr   CRP, HIGH SENSITIVITY    Collection Time: 01/24/23  2:17 AM   Result Value Ref Range    CRP, High sensitivity 6.8 mg/L   TSH 3RD GENERATION    Collection Time: 01/24/23  2:17 AM   Result Value Ref Range    TSH 4.63 (H) 0.36 - 5.01 uIU/mL   METABOLIC PANEL, BASIC    Collection Time: 01/24/23  2:17 AM   Result Value Ref Range    Sodium 138 136 - 145 mmol/L    Potassium 4.3 3.5 - 5.1 mmol/L    Chloride 107 97 - 108 mmol/L    CO2 28 21 - 32 mmol/L    Anion gap 3 (L) 5 - 15 mmol/L    Glucose 124 (H) 65 - 100 mg/dL    BUN 18 6 - 20 MG/DL    Creatinine 1.23 0.70 - 1.30 MG/DL    BUN/Creatinine ratio 15 12 - 20      eGFR 56 (L) >60 ml/min/1.73m2    Calcium 8.5 8.5 - 10.1 MG/DL   GLUCOSE, POC    Collection Time: 01/24/23  5:23 AM   Result Value Ref Range    Glucose (POC) 109 65 - 117 mg/dL    Performed by [address]        Additional comments:  I have reviewed the patient's new clinical lab test results. I have personally reviewed the patient's radiographs. MRI Results (most recent):  Results from [place] encounter on 01/22/23    MRI BRAIN WO CONT    Narrative  CLINICAL HISTORY: cva.     INDICATION: cva.    COMPARISON: 1/22/2023    TECHNIQUE: MR examination of the brain includes axial and sagittal T1, coronal  T2, axial T2, axial FLAIR, axial gradient echo, axial DWI. CONTRAST: None    FINDINGS:  Chronic infarction of the right parietal lobe and left frontal lobe with  associated encephalomalacia and chronic hemosiderin deposition. Chronic left  occipital infarction with chronic hemosiderin deposition as well. Sulcal and ventricular prominence. Chronic infarct at the right caudate. There  is no Chiari or syrinx. The pituitary and infundibulum are grossly unremarkable. There is no skull base mass. Cerebellopontine angles are grossly unremarkable. The major intracranial vascular flow-voids are unremarkable. The cavernous  sinuses are symmetric. Optic chiasm and infundibulum grossly unremarkable. Orbits are grossly symmetric. Dural venous sinuses are grossly patent. The mastoid air cells are well pneumatized and clear. Impression  Scattered chronic infarctions right frontal parietal, left frontal, left  occipital with chronic hemosiderin deposition associated. Small chronic infarct  of the right basal ganglia. There is no intracranial mass, acute intracranial hemorrhage or evidence of  acute infarction. No acute intracranial process is demonstrated. Care Plan discussed with:  Patient x   Family    RN    Care Manager    Consultant/Specialist:  NING Cruz    The patient was discussed with Dr. Jeff Mccauley.

## 2023-01-24 NOTE — CONSULTS
[facility]                        Cardiology Care Note     [x]Initial Consult     []Progress note    Patient Name: Alice Alcazar - RHV:[birth year] - XVM:[id number]  Primary Cardiologist: [facility] Physicians: Phyllis Brooks MD  Consulting Cardiologist: [facility] Physicians: Phyllis Brooks MD     Reason for consult: elevated Troponin    Assessment/Plan/Discussion:Cardiology Attending:     Patient seen on the day of progress note, examined and reviewed  with Nurse Practitioner. Will manage CAD and CHF medically and arrange OP Loop monitor  At advanced age with dementia and no cp, do not favor cath at this time  Poor EF, but hx of poor tolerance for some BP /CHFmeds  I spoke to grandson who is only relative, pt had CP 2 years ago and had 2 stents at the [facility] which improved his symptoms    I had seen patient in 10/2019 for single visit-  Had a cath 9/5/07 with a patent left circumflex stent. Hx of inferior MI stemi. Cath 2/3/16 PCI to RCA. Follow up cath 2/16 showed open stent to RCA. Cath 9/12/16 showed open stents. Hx of AAA repair December 2006. Has iliac stenosis, DM, CKD, and bradycardia. Pt is present with his grandson. Pt uses a cane. Pt moved here from [place] last year. Pt's records are from [facility], but he is now being seen by the [place]. Pt's grandson states that the [place] has been talking about putting in more stents. Pt was put on lisinopril and metoprolol. Pt's grandson states that his grandfather's BP has been lower since adding those medications    A/P:  NSTEMI-trop 2497 with no cp, but not reliable historian due to dementia  --presented on ground , unclear if syncope or GLF-orthostatic and CNS work up done,chronic CNS infarcts    CHF systolic, acute, chronic NYHA ?  Hard to   --EF was 45 in 2017 now 20-25% but does not appear to be in congestive failure, compensated  --multiple echo in 2017 , 3 showed normal EF, one showed EF 45%  So this drop in EF appears to be new  CAD coronary artery disease native-prior PCI 2016  Left circ stent prior to 2007-cath 2007 with EF 55%    Possible SAH    CKD-mild 2-3  Lab Results   Component Value Date/Time    Creatinine 1.23 01/24/2023 02:17 AM      AAA repair and iliac dz  Co morbids- DM2 COPD HTN XOL   S:Loc Rodrigues is a 80 y.o. male with  Very [place] , chief complaint is tooth pain  Denies any symptoms currently  Unable to give hx or ROS  O: VS reviewed   No data found. NC AT no JVD, clear lungs, RRR smrg, no edema    L:  Lab Results   Component Value Date/Time    Creatinine 1.23 01/24/2023 02:17 AM     Lab Results   Component Value Date/Time    NT pro-.0 06/19/2018 11:20 AM    NT pro-.4 (H) 11/20/2017 11:20 AM    NT pro-.8 06/14/2017 03:06 AM     Lab Results   Component Value Date/Time    HGB 14.1 01/24/2023 02:17 AM     01/22/23    ECHO ADULT COMPLETE 01/24/2023 1/24/2023    Interpretation Summary    Left Ventricle: Severely reduced left ventricular systolic function with a visually estimated EF of 20 - 25%. Left ventricle size is normal. Normal wall thickness. Moderate global hypokinesis present. Abnormal diastolic function. Right Ventricle: Right ventricle is mildly dilated. Normal wall thickness. Aortic Valve: Findings consistent with myxomatous degeneration. Mild to moderate regurgitation. Mitral Valve: Mild to moderate regurgitation. Pulmonary Arteries: Mild pulmonary hypertension present. The estimated PASP is 40 mmHg. Pericardium: Small (<1 cm) localized pericardial effusion present. Interatrial Septum: No interatrial shunt visualized with color Doppler. Grade 0 Absence of bubbles. Agitated saline study was negative with and without provocation. Signed by: Laz Pierce MD on 1/24/2023  1:48 PM          Laz Pierce MD   This note was created using voice recognition software.  Despite editing, there may be syntax errors. CAD  AS  CHF systolic chronic   ECHO [facility] 11-21-17   Moderate LCH, EF 37-45%, AMANDA 1.6 cm2 and mean of 9 mm Hg  NUKE 6-17-17 EF 51% normal perfusion  Echo 6-14-17 EF 55% mean 10 and Amanda 1.6  PCI POBA 2-3-16 distal RCA  Cath 6-2008  LM 20; LAD MLI, Circ stent to OM1 patent, RCA MLI  MI and PCI to Circ 2002    A. Cardiac cath: 9/5/07 due to false positive NST: Patent LCx stent with otherwise nonobstructing CAD. EF 55%  B. Negative NST on 01/29/2016  C. Hx Inferior STEMI s/p cardiac cath: 2/3/16 by Dr. Riky Chilel: Severe mid and distal RCA disease with BERLIN 0 flow in prl. Successful PCI of RCA with JÚNIOR  D. ACS s/p cardiac catheterization 2/9/16 by Dr. Angeline Tom: patent RCA stents. Non-obstructive CAD LCx and LAD-unchanged from cardiac catheterization 2/3/16    E. LHC 9/12/16, Dr. Geovani Trammell, [facility]: Patent RCA stents and LCx stent with mild-moderate/medically-treated CAD      HTN, AAA repair, high grade iliac stenosis, DM2, COPD, ADRIAN, XOL, dementia,JOSÉ MIGUEL/CKD          HPI:   Dominic Jaime is a 80 y.o. male with PMH significant for CAD s/p PCI in 2016, DM, Alzheimers disease, CKD, AAA repair 2006, iliac stenosis, bradycardia, per echo 2/17 EF 60%, 6/17 EF 55%, repeat echo 11/17 EF at 45% with mild AS. Additional hx of HTN, HLD, and COPD. He presented to hospital after being found down at [facility]. CT head of showed concerns for [facility]. MRI showed no acute infarct or hemorrhage. EKG showing SB HR 56 with IVCD. HS- troponin [id number]. CK 78/54, Creatinine 1.40, BUN 22, Glucose 76, Albumin 3.0    SUBJECTIVE:  Pt is very [place] and is poor historian. \" I just do what they tell me to do\". Does denies memory of CP or SOB. Assessment and Plan     Elevated HS troponin: pt found on the ground, noted scalp injury/bleeding and complaint of left hip pain. Initial CT head concerning for acute ischemia and SAH. Repeat CT and MRI showed no acute CVA or SAH. HS troponin [id number]. CRP 6.8 CK NL. EKG SB, IVCD. Unsure how long pt was on floor nor what symptoms if any precipitated event as pt is a poor historian. Echo has been ordered and is pending. Noted to be orthostatic this am with therapy. Resting , standing 82. Hold Metoprolol for now. He is receiving gentle hydration. Hx CAD with most recent PCI in 2016. Holding Metoprolol. Will await echo. ? If needs ischemic evaluation. Need to know his current mental status before pursing. Cont Ranexa, ASA, statin. HTN: trending lower and orthostatic. See above    HLD: on Lipitor LDL 75, HDL 40, trig 140. Cont current dose    Hx AAA repair 2006 with Dr Sula Rinne. Notes from 2018 Abdominal aortic aneurysm with a maximum diameter of 4.09 x 4.2 cm. Mural thrombus is present with a diameter reduction of 56 %. DM: on arrival glucose 76, HgbA1C 6.8 with avg glucose 148. ? If hypoglycemic event    TSH 4.63: can be monitored outpt         ____________________________________________________________    Cardiac testing      06/14/17    NM CARDIAC SPECT W STRS/REST MULT 06/16/2017 6/16/2017    Narrative  Nuclear gated cardiac SPECT imaging    Indication: Angina    Technique: Patient was given 10.8 mCi sestamibi for resting and 33 mCi sestamibi  for stress. Gated SPECT imaging was performed with ejection fraction and wall  motion studies. Whitley Ivory. Exam reviewed with Dr. Grant Burger. Findings: . Ejection fraction is normal at 51 %. Normal wall motion studies are  identified. There is no evidence for ischemia or infarction. Normal study    Impression  Impression: Negative cardiac gated SPECT imaging scan. Signed by: Maame Weathers MD on 6/16/2017  2:40 PM         5/29/2018  PVL AORTA ANEURYSM DUPLEX    Conclusions: Abdominal aortic aneurysm with a maximum diameter of 4.09 x 4.2 cm. Mural thrombus is present with a diameter reduction of 56 %. Left common iliac artery aneurysm(s) with a maximal diameter of 2.3 x 2.49 cm. The maximal diameter of the right common iliac artery is 1.73 x 1.62 cm  The celiac, and renal arteries show no evidence for hemodynamically significant stenosis at their origins. Superior mesenteric artery Doppler signal is low resistant with multiple collateral noted. When compared to the exam performed on 12/16/2016, the previous aneurysm was 4.23 x 4.0 cm and now measures 4.09 x 4.2 cm.  Mural thrombus is noted on today's exam.       Most recent HS troponins:  Recent Labs     01/23/23  2018 01/23/23  1553   TROPHS 1,467* 2,497*       ECG: SB, IVCD    Review of Systems:    []All other systems reviewed and all negative except as written in HPI    [x] Patient unable to provide secondary to condition    Past Medical History:   Diagnosis Date    Acid reflux     Allergic contact dermatitis due to other chemical products     Allergic rhinitis     Alzheimer's disease with late onset (CODE) ([address]     Aneurysm of aorta (HCC)     Angina pectoris ([address]     Aortic stenosis 10/06/2019    Atherosclerotic heart disease of native coronary artery without angina pectoris     Cellulitis     Chronic kidney disease, stage 3 (HCC)     Chronic obstructive pulmonary disease ([address]     Coronary artery disease     Dementia ([address]     Depression     Diabetes mellitus ([address]     Dry eye syndrome of bilateral lacrimal glands     Elevated cholesterol     Generalized anxiety disorder     GERD without esophagitis     Glaucoma     Glaucoma     High triglycerides     [place] (hard of hearing)     Hyperlipemia     Hypertension     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    Ill-defined condition     some memory loss    Muscle wasting and atrophy, not elsewhere classified, left shoulder     Obstructive sleep apnea     Pain, unspecified     Personal history of COVID-19     Restless legs syndrome     Unspecified sleep apnea     C PAP    Urethral stricture      Past Surgical History:   Procedure Laterality Date    COLONOSCOPY  11/3/2014         COLONOSCOPY N/A 2/1/2017    COLONOSCOPY cold bx polypectomy and heated polypectomy and clipping performed by Guru Simeon MD at [facility] AAA REPAIR  12/13/2006 Dr Swati Martinez  2002    HX TURP  2004     Social Hx:  reports that he quit smoking about 48 years ago. His smoking use included cigarettes. He has a 24.00 pack-year smoking history. He has never used smokeless tobacco. He reports current alcohol use. He reports that he does not use drugs. Family Hx: family history is not on file. Allergies   Allergen Reactions    Morphine Rash and Itching     mild    Tuberculin Julia-Ppd Swelling          OBJECTIVE:  Wt Readings from Last 3 Encounters:   01/23/23 72.6 kg (160 lb)   10/02/19 99.8 kg (220 lb)   06/19/18 90.7 kg (200 lb)       Intake/Output Summary (Last 24 hours) at 1/24/2023 0944  Last data filed at 1/24/2023 0413  Gross per 24 hour   Intake --   Output 300 ml   Net -300 ml       Physical Exam:    Vitals:   Vitals:    01/24/23 0359 01/24/23 0557 01/24/23 0629 01/24/23 0919   BP:   (!) 126/108 110/78   Pulse: 69 74 77 82   Resp:   16 18   Temp:   98.5 °F (36.9 °C) 98.2 °F (36.8 °C)   SpO2:   98% 91%   Weight:       Height:         Telemetry: normal sinus rhythm    Gen: Well-developed, well-nourished, in no acute distress  Neck: Supple, No JVD, No Carotid Bruit  Resp: No accessory muscle use, Clear breath sounds, No rales or rhonchi  Card: Regular Rate,Rhythm, Normal S1, S2, No murmurs, rubs or gallop. No thrills. Abd:   Soft, non-tender, non-distended, BS+   MSK: No cyanosis  Skin: No rashes    Neuro: Moving all four extremities, follows commands appropriately  Psych: oriented to person, alert, Nml Affect  LE: No edema    Data Review:     Radiology:   XR Results (most recent):  Results from [place] encounter on 01/22/23    XR PELV AP ONLY    Narrative  Clinical history: Fall/pain  INDICATION:   Fall/pain  COMPARISON: None  FINDINGS:  AP view of the pelvis is obtained.   Visualized osseous structures are without evidence of fracture-dislocation. No  significant pelvic abnormality is identified. There is no significant soft tissue abnormality identified. Impression  No acute fracture or dislocation.       Recent Labs     01/24/23 0217 01/23/23  1553    139   K 4.3 3.8    107   CO2 28 27   BUN 18 17   CREA 1.23 1.22   * 128*   CA 8.5 8.6     Recent Labs     01/24/23  0217 01/23/23  1553   WBC 9.7 9.6   HGB 14.1 14.8   HCT 44.0 49.1    304     Recent Labs     01/22/23  2341   AP 89     Recent Labs     01/24/23 0217   CHOL 143   LDLC 75         Current meds:    Current Facility-Administered Medications:     donepeziL (ARICEPT) tablet 10 mg, 10 mg, Oral, DAILY, Hien Harden MD, 10 mg at 01/24/23 0915    latanoprost (XALATAN) 0.005 % ophthalmic solution 1 Drop, 1 Drop, Both Eyes, QHS, Hien Harden MD, 1 Drop at 01/24/23 0159    memantine (NAMENDA) tablet 10 mg, 10 mg, Oral, BID, Maximo Swanson MD, 10 mg at 01/24/23 0915    metoprolol tartrate (LOPRESSOR) tablet 12.5 mg, 12.5 mg, Oral, Q12H, Hien Harden MD, 12.5 mg at 01/24/23 0915    mirtazapine (REMERON) tablet 7.5 mg, 7.5 mg, Oral, QHS, Hein Harden MD, 7.5 mg at 01/24/23 0202    montelukast (SINGULAIR) tablet 10 mg, 10 mg, Oral, DAILY, Hien Harden MD, 10 mg at 01/24/23 0914    ranolazine ER (RANEXA) tablet 500 mg, 500 mg, Oral, BID, Maximo Swanson MD, 500 mg at 01/24/23 0915    sertraline (ZOLOFT) tablet 150 mg, 150 mg, Oral, QHS, Maximo Swanson MD, 150 mg at 01/24/23 0203    timolol (TIMOPTIC) 0.5 % ophthalmic solution 1 Drop, 1 Drop, Both Eyes, Q12H, Hien Harden MD, 1 Drop at 01/24/23 0921    dextrose 5 % - 0.45% NaCl infusion, 75 mL/hr, IntraVENous, CONTINUOUS, Hien Harden MD, Last Rate: 75 mL/hr at 01/24/23 0207, 75 mL/hr at 01/24/23 0207    acetaminophen (TYLENOL) tablet 650 mg, 650 mg, Oral, Q4H PRN, 650 mg at 01/23/23 1343 **OR** acetaminophen (TYLENOL) solution 650 mg, 650 mg, Per NG tube, Q4H PRN **OR** acetaminophen (TYLENOL) suppository 650 mg, 650 mg, Rectal, Q4H PRN, Kat Pavon MD    aspirin chewable tablet 81 mg, 81 mg, Oral, DAILY, Hien Harden MD, 81 mg at 01/24/23 0914    atorvastatin (LIPITOR) tablet 40 mg, 40 mg, Oral, QHS, Hien Harden MD, 40 mg at 01/23/23 2223    glucose chewable tablet 16 g, 4 Tablet, Oral, PRN, Hien Henao MD    glucagon (GLUCAGEN) injection 1 mg, 1 mg, IntraMUSCular, PRN, Hien Harden MD    dextrose 10 % infusion 0-250 mL, 0-250 mL, IntraVENous, PRN, Hien Henao MD    insulin lispro (HUMALOG) injection, , SubCUTAneous, Q6H, Hien Harden MD    famotidine (PEPCID) tablet 20 mg, 20 mg, Oral, DAILY, [place], Yoel LEVINE MD, 20 mg at 01/24/23 0914    sodium chloride (NS) flush 5-40 mL, 5-40 mL, IntraVENous, Q8H, Azar Neely MD, 10 mL at 01/23/23 1344    sodium chloride (NS) flush 5-40 mL, 5-40 mL, IntraVENous, PRN, MD Nelly Pelletier, VANE    [facility]  Call center: [place]) [phone]      Em Saldana MD

## 2023-01-25 ENCOUNTER — TRANSCRIBE ORDER (OUTPATIENT)
Dept: CARDIAC REHAB | Age: 88
End: 2023-01-25

## 2023-01-25 DIAGNOSIS — I50.22 CHRONIC SYSTOLIC HEART FAILURE (HCC): Primary | ICD-10-CM

## 2023-01-25 LAB
GLUCOSE BLD STRIP.AUTO-MCNC: 108 MG/DL (ref 65–117)
GLUCOSE BLD STRIP.AUTO-MCNC: 141 MG/DL (ref 65–117)
GLUCOSE BLD STRIP.AUTO-MCNC: 156 MG/DL (ref 65–117)
GLUCOSE BLD STRIP.AUTO-MCNC: 171 MG/DL (ref 65–117)
SERVICE CMNT-IMP: ABNORMAL
SERVICE CMNT-IMP: NORMAL

## 2023-01-25 PROCEDURE — 99232 SBSQ HOSP IP/OBS MODERATE 35: CPT | Performed by: SPECIALIST

## 2023-01-25 PROCEDURE — 82962 GLUCOSE BLOOD TEST: CPT

## 2023-01-25 PROCEDURE — 74011000250 HC RX REV CODE- 250: Performed by: EMERGENCY MEDICINE

## 2023-01-25 PROCEDURE — 92526 ORAL FUNCTION THERAPY: CPT

## 2023-01-25 PROCEDURE — 99232 SBSQ HOSP IP/OBS MODERATE 35: CPT | Performed by: NURSE PRACTITIONER

## 2023-01-25 PROCEDURE — 74011000250 HC RX REV CODE- 250: Performed by: FAMILY MEDICINE

## 2023-01-25 PROCEDURE — 74011250637 HC RX REV CODE- 250/637: Performed by: FAMILY MEDICINE

## 2023-01-25 PROCEDURE — 65270000046 HC RM TELEMETRY

## 2023-01-25 RX ADMIN — LATANOPROST 1 DROP: 50 SOLUTION OPHTHALMIC at 22:00

## 2023-01-25 RX ADMIN — SODIUM CHLORIDE, PRESERVATIVE FREE 10 ML: 5 INJECTION INTRAVENOUS at 17:25

## 2023-01-25 RX ADMIN — MEMANTINE 10 MG: 5 TABLET ORAL at 10:04

## 2023-01-25 RX ADMIN — TIMOLOL MALEATE 1 DROP: 5 SOLUTION OPHTHALMIC at 10:08

## 2023-01-25 RX ADMIN — RANOLAZINE 500 MG: 500 TABLET, FILM COATED, EXTENDED RELEASE ORAL at 10:05

## 2023-01-25 RX ADMIN — SODIUM CHLORIDE, PRESERVATIVE FREE 10 ML: 5 INJECTION INTRAVENOUS at 17:55

## 2023-01-25 RX ADMIN — METOPROLOL TARTRATE 12.5 MG: 25 TABLET, FILM COATED ORAL at 21:47

## 2023-01-25 RX ADMIN — MIRTAZAPINE 7.5 MG: 15 TABLET, FILM COATED ORAL at 21:47

## 2023-01-25 RX ADMIN — MONTELUKAST 10 MG: 10 TABLET, FILM COATED ORAL at 10:04

## 2023-01-25 RX ADMIN — TIMOLOL MALEATE 1 DROP: 5 SOLUTION OPHTHALMIC at 22:00

## 2023-01-25 RX ADMIN — MEMANTINE 10 MG: 5 TABLET ORAL at 17:54

## 2023-01-25 RX ADMIN — RANOLAZINE 500 MG: 500 TABLET, FILM COATED, EXTENDED RELEASE ORAL at 21:47

## 2023-01-25 RX ADMIN — DEXTROSE AND SODIUM CHLORIDE 75 ML/HR: 5; 450 INJECTION, SOLUTION INTRAVENOUS at 05:22

## 2023-01-25 RX ADMIN — ASPIRIN 81 MG: 81 TABLET, CHEWABLE ORAL at 10:04

## 2023-01-25 RX ADMIN — SODIUM CHLORIDE, PRESERVATIVE FREE 10 ML: 5 INJECTION INTRAVENOUS at 05:44

## 2023-01-25 RX ADMIN — FAMOTIDINE 20 MG: 20 TABLET, FILM COATED ORAL at 10:04

## 2023-01-25 RX ADMIN — DONEPEZIL HYDROCHLORIDE 10 MG: 10 TABLET, FILM COATED ORAL at 10:04

## 2023-01-25 RX ADMIN — SODIUM CHLORIDE, PRESERVATIVE FREE 10 ML: 5 INJECTION INTRAVENOUS at 21:47

## 2023-01-25 RX ADMIN — METOPROLOL TARTRATE 12.5 MG: 25 TABLET, FILM COATED ORAL at 10:04

## 2023-01-25 RX ADMIN — ATORVASTATIN CALCIUM 40 MG: 40 TABLET, FILM COATED ORAL at 21:47

## 2023-01-25 RX ADMIN — SERTRALINE 150 MG: 50 TABLET, FILM COATED ORAL at 21:47

## 2023-01-25 NOTE — PROGRESS NOTES
Problem: Discharge Planning  Goal: *Discharge to safe environment  Outcome: Progressing Towards Goal  Goal: *Knowledge of medication management  Outcome: Progressing Towards Goal  Goal: *Knowledge of discharge instructions  Outcome: Progressing Towards Goal     Problem: Patient Education: Go to Patient Education Activity  Goal: Patient/Family Education  Outcome: Progressing Towards Goal     Problem: Discharge Planning  Goal: *Discharge to safe environment  Outcome: Progressing Towards Goal  Goal: *Knowledge of medication management  Outcome: Progressing Towards Goal  Goal: *Knowledge of discharge instructions  Outcome: Progressing Towards Goal     Problem: Patient Education: Go to Patient Education Activity  Goal: Patient/Family Education  Outcome: Progressing Towards Goal     Problem: Diabetes Self-Management  Goal: *Disease process and treatment process  Description: Define diabetes and identify own type of diabetes; list 3 options for treating diabetes. Outcome: Progressing Towards Goal  Goal: *Incorporating nutritional management into lifestyle  Description: Describe effect of type, amount and timing of food on blood glucose; list 3 methods for planning meals. Outcome: Progressing Towards Goal  Goal: *Incorporating physical activity into lifestyle  Description: State effect of exercise on blood glucose levels. Outcome: Progressing Towards Goal  Goal: *Developing strategies to promote health/change behavior  Description: Define the ABC's of diabetes; identify appropriate screenings, schedule and personal plan for screenings. Outcome: Progressing Towards Goal  Goal: *Using medications safely  Description: State effect of diabetes medications on diabetes; name diabetes medication taking, action and side effects. Outcome: Progressing Towards Goal  Goal: *Monitoring blood glucose, interpreting and using results  Description: Identify recommended blood glucose targets  and personal targets.   Outcome: Progressing Towards Goal  Goal: *Prevention, detection, treatment of acute complications  Description: List symptoms of hyper- and hypoglycemia; describe how to treat low blood sugar and actions for lowering  high blood glucose level. Outcome: Progressing Towards Goal  Goal: *Prevention, detection and treatment of chronic complications  Description: Define the natural course of diabetes and describe the relationship of blood glucose levels to long term complications of diabetes. Outcome: Progressing Towards Goal  Goal: *Developing strategies to address psychosocial issues  Description: Describe feelings about living with diabetes; identify support needed and support network  Outcome: Progressing Towards Goal  Goal: *Insulin pump training  Outcome: Progressing Towards Goal  Goal: *Sick day guidelines  Outcome: Progressing Towards Goal  Goal: *Patient Specific Goal (EDIT GOAL, INSERT TEXT)  Outcome: Progressing Towards Goal     Problem: Falls - Risk of  Goal: *Absence of Falls  Description: Document Jeff Fall Risk and appropriate interventions in the flowsheet.   Outcome: Progressing Towards Goal  Note: Fall Risk Interventions:  Mobility Interventions: Assess mobility with egress test    Mentation Interventions: Adequate sleep, hydration, pain control    Medication Interventions: Assess postural VS orthostatic hypotension    Elimination Interventions: Bed/chair exit alarm              Problem: Patient Education: Go to Patient Education Activity  Goal: Patient/Family Education  Outcome: Progressing Towards Goal     Problem: Patient Education: Go to Patient Education Activity  Goal: Patient/Family Education  Outcome: Progressing Towards Goal     Problem: Patient Education: Go to Patient Education Activity  Goal: Patient/Family Education  Outcome: Progressing Towards Goal     Problem: Pressure Injury - Risk of  Goal: *Prevention of pressure injury  Description: Document Reji Scale and appropriate interventions in the flowsheet.   Outcome: Progressing Towards Goal  Note: Pressure Injury Interventions:  Sensory Interventions: Assess changes in LOC    Moisture Interventions: Absorbent underpads    Activity Interventions: Assess need for specialty bed    Mobility Interventions: Assess need for specialty bed    Nutrition Interventions: Document food/fluid/supplement intake    Friction and Shear Interventions: Apply protective barrier, creams and emollients

## 2023-01-25 NOTE — PROGRESS NOTES
Received call from radiology concerning new infarct on patient. Acute superimposed on chronic left frontal infarct. VANE Hinton informed. Will relay information to night RN in shift report. Cassi Barbour

## 2023-01-25 NOTE — PROGRESS NOTES
Patient can be put on my schedule for tomorrow, 04/21/2020, at 11:00 for a video visit.   Post Fall Documentation    Vale Tenorio witnessed/unwitnessed fall occurred on 1/24/23 (Date) at 2055 (Time). The answers to the following questions summarize the fall: In the patient's own words:  What were you attempting to do when you fell? Going to meet wife  Do you know why you fell? no  Do you have any pain/discomfort or any other complaints? no  Which part of your body made contact with the floor or other object? Dont know  Nurse:  Was this an assisted fall? no  Was fall witnessed? No  If witnessed, what part of the body made contact with the floor or other object?  unknown  Patients mental status after the fall/when found: Confused  Any apparent injury:  No apparent injury  Immediate interventions for injury/suspected injury? Other CT  Patient assisted back to bed? Assist X1  Name of provider notified and time, any comments? ANGELIQUE Hinton 2100       Name of family member notified and time: Iris Deal [facility]) 2110    Document Immediate VS and physical assessment in flowsheets. Document Neuro assessment every hour x 4 (for potential head injury or unwitnessed fall) in flowsheets.         Ramona Mistry RN

## 2023-01-25 NOTE — PROGRESS NOTES
Problem: Discharge Planning  Goal: *Discharge to safe environment  Outcome: Progressing Towards Goal  Goal: *Knowledge of medication management  Outcome: Progressing Towards Goal  Goal: *Knowledge of discharge instructions  Outcome: Progressing Towards Goal     Problem: Patient Education: Go to Patient Education Activity  Goal: Patient/Family Education  Outcome: Progressing Towards Goal     Problem: Discharge Planning  Goal: *Discharge to safe environment  Outcome: Progressing Towards Goal  Goal: *Knowledge of medication management  Outcome: Progressing Towards Goal  Goal: *Knowledge of discharge instructions  Outcome: Progressing Towards Goal

## 2023-01-25 NOTE — PROGRESS NOTES
Neurology Progress Note     NAME: Mame Dixon   :  4/3/1934   MRN:  [id number]   DATE:  2023    Assessment:     Active Problems:    SAH (subarachnoid hemorrhage) ([facility] [address] (2023)      Ischemic stroke ([facility] [address] (2023)      Fall from ground level (2023)  Patient is an 80year-old L-handed male with history of Alzheimers disease, HTN, HLD, DM type 2, AS, CAD s/p PCI , COPD and CKD who was found down at his SNF 23 and presented to [facility]. He was noted to have a posterior scalp laceration. CT head showed concern for L occipital hypodensity with concern for \"trace superimposed SAH\". Repeat CT yesterday showed no change. Neurosurgery evaluated and there was no hemorrhage on review. Patient states he was not taking aspirin at baseline (although listed on prior med list). MRI brain w/o contrast (23) shows scattered chronic infarctions R frontoparietal, R BG, L frontal and L occipital.   LDL 75, HgbA1C 6.8, TSH 4.63, troponin 2497, 1467  Unclear circumstances of patient being found down at SNF to include no record of if patient had any of the following: LOC, palpitations, SOB, dizziness, weakness, incontinence, or signs of seizure activity. Exam today: Very [place]. Oriented x1-2. Not time. Knows his age. Follows commands x4 5/5. Unclear still if LOC or mechanical fall. CTA head/neck (23) showed no definitive LVO or stenosis. There is delayed enhancement of L occipital hypodensity and less hypodensity but present to L frontal region suggesting subacute and not chronic infarct. TTE (23) demonstrates EF 20-25% with moderate global hypokinesis and abnormal diastolic function, LA normal size, mild to mod AVR/MVR, bubble study negative. EEG with mild, bursts of slowing. No epileptiform discharges. No seizures.    Plan:   1.) Found down:  - MRI negative for acute stroke but there are subacute strokes present  - Unclear presentation. Possible etiologies multifactorial to include hypoglycemia, orthostatic hypotension, NSTEMI/demand ischemia with compensated heart failure, versus seizures  - EEG with no seizures   - CTA head/neck (see above) with no significant stenosis    2.) Subacute strokes:  - continue aspirin 81 mg daily  - continue atorvastatin 40 mg daily for goal LDL <70 given prior strokes  - goal SBP <140/80  - PT/OT, Dispo SNF [facility] and [address] [place] [address]    Neurology has no further recommendations but please contact us with any questions. The patient should follow-up in the clinic with Neurology in 8-12 weeks. Subjective:   Patient is resting in bed and reports 6/10 LBP.     Objective:   Chart reviewed since last seen    Current Facility-Administered Medications   Medication Dose Route Frequency    donepeziL (ARICEPT) tablet 10 mg  10 mg Oral DAILY    latanoprost (XALATAN) 0.005 % ophthalmic solution 1 Drop  1 Drop Both Eyes QHS    memantine (NAMENDA) tablet 10 mg  10 mg Oral BID    metoprolol tartrate (LOPRESSOR) tablet 12.5 mg  12.5 mg Oral Q12H    mirtazapine (REMERON) tablet 7.5 mg  7.5 mg Oral QHS    montelukast (SINGULAIR) tablet 10 mg  10 mg Oral DAILY    ranolazine ER (RANEXA) tablet 500 mg  500 mg Oral BID    sertraline (ZOLOFT) tablet 150 mg  150 mg Oral QHS    timolol (TIMOPTIC) 0.5 % ophthalmic solution 1 Drop  1 Drop Both Eyes Q12H    acetaminophen (TYLENOL) tablet 650 mg  650 mg Oral Q4H PRN    Or    acetaminophen (TYLENOL) solution 650 mg  650 mg Per NG tube Q4H PRN    Or    acetaminophen (TYLENOL) suppository 650 mg  650 mg Rectal Q4H PRN    aspirin chewable tablet 81 mg  81 mg Oral DAILY    atorvastatin (LIPITOR) tablet 40 mg  40 mg Oral QHS    glucose chewable tablet 16 g  4 Tablet Oral PRN    glucagon (GLUCAGEN) injection 1 mg  1 mg IntraMUSCular PRN    dextrose 10 % infusion 0-250 mL  0-250 mL IntraVENous PRN    insulin lispro (HUMALOG) injection   SubCUTAneous Q6H    famotidine (PEPCID) tablet 20 mg  20 mg Oral DAILY    sodium chloride (NS) flush 5-40 mL  5-40 mL IntraVENous Q8H    sodium chloride (NS) flush 5-40 mL  5-40 mL IntraVENous PRN       Visit Vitals  /80   Pulse (!) 55   Temp 98 °F (36.7 °C)   Resp 18   Ht 6' (1.829 m)   Wt 76.6 kg (168 lb 14 oz)   SpO2 98%   BMI 22.90 kg/m²     Temp (24hrs), Av.1 °F (36.7 °C), Min:97.6 °F (36.4 °C), Max:98.4 °F (36.9 °C)      No intake/output data recorded.  1901 -  0700  In: -   Out: 200 [Urine:200]      Physical Exam:  General: Well developed well nourished patient in no apparent distress. OOB to chair. Cardiac: Regular rate and rhythm with no murmurs. Extremities: 2+ Radial pulses, no cyanosis or edema    Neurological Exam:  Mental Status: A&O x2. Not time. Speech and language intact. Remote memory confusion. Cranial Nerves:   VFF, PERRL, EOMI, no nystagmus, no ptosis. Facial sensation is normal. Facial movement is symmetric. Palate is midline. Tongue is midline. Hearing is intact bilaterally. Motor:  FC 5/5 x4. No PD. No tremors   Reflexes: Toes downgoing.    Sensory:   SILT bilat throughout   Gait:  Deferred   Cerebellar:  Deferred         Lab Review   Recent Results (from the past 24 hour(s))   GLUCOSE, POC    Collection Time: 23  4:37 PM   Result Value Ref Range    Glucose (POC) 119 (H) 65 - 117 mg/dL    Performed by Rosemary Mckeon    GLUCOSE, POC    Collection Time: 23 12:56 AM   Result Value Ref Range    Glucose (POC) 171 (H) 65 - 117 mg/dL    Performed by Vineet 76, POC    Collection Time: 23  5:28 AM   Result Value Ref Range    Glucose (POC) 141 (H) 65 - 117 mg/dL    Performed by Winnie RAPP    GLUCOSE, POC    Collection Time: 23 11:42 AM   Result Value Ref Range    Glucose (POC) 156 (H) 65 - 117 mg/dL    Performed by King Gray        Additional comments:  I have reviewed the patient's new clinical lab test results. I have personally reviewed the patient's radiographs. MRI Results (most recent):  Results from [place] encounter on 01/22/23    MRI BRAIN WO CONT    Narrative  CLINICAL HISTORY: cva. INDICATION: cva.    COMPARISON: 1/22/2023    TECHNIQUE: MR examination of the brain includes axial and sagittal T1, coronal  T2, axial T2, axial FLAIR, axial gradient echo, axial DWI. CONTRAST: None    FINDINGS:  Chronic infarction of the right parietal lobe and left frontal lobe with  associated encephalomalacia and chronic hemosiderin deposition. Chronic left  occipital infarction with chronic hemosiderin deposition as well. Sulcal and ventricular prominence. Chronic infarct at the right caudate. There  is no Chiari or syrinx. The pituitary and infundibulum are grossly unremarkable. There is no skull base mass. Cerebellopontine angles are grossly unremarkable. The major intracranial vascular flow-voids are unremarkable. The cavernous  sinuses are symmetric. Optic chiasm and infundibulum grossly unremarkable. Orbits are grossly symmetric. Dural venous sinuses are grossly patent. The mastoid air cells are well pneumatized and clear. Impression  Scattered chronic infarctions right frontal parietal, left frontal, left  occipital with chronic hemosiderin deposition associated. Small chronic infarct  of the right basal ganglia. There is no intracranial mass, acute intracranial hemorrhage or evidence of  acute infarction. No acute intracranial process is demonstrated. CT Results (most recent):  Results from Hospital Encounter encounter on 01/22/23    CT HEAD WO CONT    Narrative  EXAM: CT HEAD WO CONT    INDICATION: Fall    COMPARISON: 1/24/2023. CONTRAST: None. TECHNIQUE: Unenhanced CT of the head was performed using 5 mm images. Brain and  bone windows were generated. Coronal and sagittal reformats.  CT dose reduction  was achieved through use of a standardized protocol tailored for this  examination and automatic exposure control for dose modulation. FINDINGS:  The ventricles and sulci are stable in size, shape and configuration. There is  stable periventricular white matter hypodensity. A well-defined infarct is  present in the right parietal lobe. The left frontal infarct has superimposed  effacement of cortical sulci, suggesting acute superimposed on old infarct. There is persistent hyperdensity in the left medial occipital lobe, previously  described represent hemosiderin. A stable lacunar infarct is present in the  right external capsule. There is no intracranial hemorrhage, extra-axial  collection. The basilar cisterns are open. The bone windows demonstrate no abnormalities. The visualized portions of the  paranasal sinuses and mastoid air cells are clear. Impression  Acute superimposed on chronic left frontal infarct  Old right parietal infarct and old left occipital infarct      This result was verbally relayed by me at 2203 hours to [place], [address] discussed with:  Patient x   Family    RN    Care Manager    Consultant/Specialist:  abi Laura, NING    The patient was discussed with Dr. Suly Zarco.

## 2023-01-25 NOTE — PROGRESS NOTES
Patient with unwitnessed fall in room at 2055 1/24/23. Unknown if patient hit head. RN in room every 5 - 10 minutes because of fall risk. RN in room at 2050 to meet any patient needs and to see if patient needed to use bathroom. Patient was to be moved to room 660, (closer to nurses station) but room was not yet cleaned. Alarm on and active, non skid socks on, room uncluttered, Charge RN aware of fall risk, on camera,multiple visual checks on patient. VANE Hinton informed of fall. Nursing supervisor informed of fall. Josep Cashdasha (Grandchild) informed of fall. Ct of head ordered. Bilat soft wrist restraints ordered. Patient moved to 660 when room cleaned.

## 2023-01-25 NOTE — PROGRESS NOTES
[facility]                       Cardiology Care Note     []Initial Consult     [x]Progress note    Patient Name: Jonatan Yost - CRE:[id number] - THS:[id number]  Primary Cardiologist: [facility] [employer] [facility] Physicians: Garth Santiago MD  Consulting Cardiologist: [facility] [employer] [facility] Physicians: Garth Santiago MD     Reason for consult: elevated Troponin    Subjective: pt drowsy, confused. Noted events overnight with unwitnessed fall. MRI of brain neg for acute event. 1/25/2023   Difficulty with altered mental status last night repeat scans have shown different findings about whether this is acute or chronic infarction. He has no chest pain he is somewhat obtunded but arousable he is a poor ejection fraction advanced age multiple strokes has been found down I do not think is a candidate appropriate for invasive cardiovascular procedures. I recommendation is that he be managed conservatively. Unfortunately limited in any treatment of his cardiomyopathy because of low blood pressure. I would suggest considering a palliative care consult and making him comfort care but will defer that to the primary team.  We will see back as needed. Signing off  No longer plan OP loop due to clinical developments and work up thus far      NSTEMI-trop 2497 with no cp, but not reliable historian due to dementia  --presented on ground , unclear if syncope or GLF-orthostatic and CNS work up done,chronic CNS infarcts     CHF systolic, acute, chronic NYHA ?  Hard to   --EF was 45 in 2017 now 20-25% but does not appear to be in congestive failure, compensated  --multiple echo in 2017 , 3 showed normal EF, one showed EF 45%  So this drop in EF appears to be new  CAD coronary artery disease native-prior PCI 2016  Left circ stent prior to 2007-cath 2007 with EF 55%    No change in CV exam  I had seen patient in 10/2019 for single visit-  Had a cath 9/5/07 with a patent left circumflex stent. Hx of inferior MI stemi. Cath 2/3/16 PCI to RCA. Follow up cath 2/16 showed open stent to RCA. Cath 9/12/16 showed open stents. Hx of AAA repair December 2006. Has iliac stenosis, DM, CKD, and bradycardia. Pt is present with his grandson. Pt uses a cane. Pt moved here from [place] last year. Pt's records are from [facility], but he is now being seen by the [place]. Pt's grandson states that the [place] has been talking about putting in more stents. Pt was put on lisinopril and metoprolol. Pt's grandson states that his grandfather's BP has been lower since adding those medications  Patient seen earlier today and examined  and agree with Advance Practice Provider (MARYANN, NP,PA)  assessment and plans. Aubrey Maguire MD        Assessment and Plan    CAD with NSTEMI-HS trop 2497 without reported CP, but not reliable historian due to dementia. Per grandson 2 years ago he was having active CP and had cardiac cath with 2 stents at the [place]. He presented being found on the ground, unclear if syncope or GLF-orthostatic and CNS work up done,chronic CNS infarcts. He was orthostatic yesterday. Post IVF. Given mental status and no active chest pain medical management only which grandson is in agreement with. BP low so BB held at moment. Cont on ASA and statin    CHF systolic, acute, chronic NYHA ? Hard to  given confusion and lack of mobility. Per echo EF was 45% in 2017 now 20-25% but does not appear to be in congestive failure, compensated. GDMT limited by hypotension. CKD-mild 2-3     AAA repair and iliac dz  Co morbids- DM2 COPD HTN XOL             HPI:   Mame Dixon is a 80 y.o. male with PMH significant for CAD s/p PCI in 2016, DM, Alzheimers disease, CKD, AAA repair 2006, iliac stenosis, bradycardia, per echo 2/17 EF 60%, 6/17 EF 55%, repeat echo 11/17 EF at 45% with mild AS. Additional hx of HTN, HLD, and COPD. He presented to hospital after being found down at [facility].  CT head of showed concerns for [facility]. MRI showed no acute infarct or hemorrhage. EKG showing SB HR 56 with IVCD. HS- troponin [id number]. CK 78/54, Creatinine 1.40, BUN 22, Glucose 76, Albumin 3.0        ____________________________________________________________    Cardiac testing      06/14/17    NM CARDIAC SPECT W STRS/REST MULT 06/16/2017 6/16/2017    Narrative  Nuclear gated cardiac SPECT imaging    Indication: Angina    Technique: Patient was given 10.8 mCi sestamibi for resting and 33 mCi sestamibi  for stress. Gated SPECT imaging was performed with ejection fraction and wall  motion studies. Jeff Rojas. Exam reviewed with Dr. Dimitry Moreland. Findings: . Ejection fraction is normal at 51 %. Normal wall motion studies are  identified. There is no evidence for ischemia or infarction. Normal study    Impression  Impression: Negative cardiac gated SPECT imaging scan. Signed by: Jacques Carter MD on 6/16/2017  2:40 PM         5/29/2018  PVL AORTA ANEURYSM DUPLEX    Conclusions: Abdominal aortic aneurysm with a maximum diameter of 4.09 x 4.2 cm. Mural thrombus is present with a diameter reduction of 56 %. Left common iliac artery aneurysm(s) with a maximal diameter of 2.3 x 2.49 cm. The maximal diameter of the right common iliac artery is 1.73 x 1.62 cm  The celiac, and renal arteries show no evidence for hemodynamically significant stenosis at their origins. Superior mesenteric artery Doppler signal is low resistant with multiple collateral noted. When compared to the exam performed on 12/16/2016, the previous aneurysm was 4.23 x 4.0 cm and now measures 4.09 x 4.2 cm.  Mural thrombus is noted on today's exam.       Most recent HS troponins:  Recent Labs     01/23/23  2018 01/23/23  1553   TROPHS 1,467* 2,497*       ECG: SB, IVCD    Review of Systems:    []All other systems reviewed and all negative except as written in HPI    [x] Patient unable to provide secondary to condition    Past Medical History:   Diagnosis Date    Acid reflux     Allergic contact dermatitis due to other chemical products     Allergic rhinitis     Alzheimer's disease with late onset (CODE) ([facility] [address]     Aneurysm of aorta (HCC)     Angina pectoris ([facility] [address]     Aortic stenosis 10/06/2019    Atherosclerotic heart disease of native coronary artery without angina pectoris     Cellulitis     Chronic kidney disease, stage 3 (HCC)     Chronic obstructive pulmonary disease (HCC)     Coronary artery disease     Dementia (HCC)     Depression     Diabetes mellitus ([facility] [address]     Dry eye syndrome of bilateral lacrimal glands     Elevated cholesterol     Generalized anxiety disorder     GERD without esophagitis     Glaucoma     Glaucoma     High triglycerides     [place] (hard of hearing)     Hyperlipemia     Hypertension     Iliac aneurysm ([facility] [address] 10/06/2019    Ill-defined condition     some memory loss    Muscle wasting and atrophy, not elsewhere classified, left shoulder     Obstructive sleep apnea     Pain, unspecified     Personal history of COVID-19     Restless legs syndrome     Unspecified sleep apnea     C PAP    Urethral stricture      Past Surgical History:   Procedure Laterality Date    COLONOSCOPY  11/3/2014         COLONOSCOPY N/A 2/1/2017    COLONOSCOPY cold bx polypectomy and heated polypectomy and clipping performed by Alyssa Roque MD at [place] [address] AAA REPAIR  12/13/2006 Dr Tierney Cook  2002    HX TURP  2004     Social Hx:  reports that he quit smoking about 48 years ago. His smoking use included cigarettes. He has a 24.00 pack-year smoking history. He has never used smokeless tobacco. He reports current alcohol use. He reports that he does not use drugs. Family Hx: family history is not on file.   Allergies   Allergen Reactions    Morphine Rash and Itching     mild    Tuberculin Julia-Ppd Swelling          OBJECTIVE:  Wt Readings from Last 3 Encounters:   01/25/23 76.6 kg (168 lb 14 oz)   10/02/19 99.8 kg (220 lb)   06/19/18 90.7 kg (200 lb)     No intake or output data in the 24 hours ending 01/25/23 1014      Physical Exam:    Vitals:   Vitals:    01/25/23 0200 01/25/23 0351 01/25/23 0957 01/25/23 1004   BP: (!) 140/91  114/74 119/80   Pulse: 86 74 73 69   Resp: 16  18    Temp: 97.6 °F (36.4 °C)  98 °F (36.7 °C)    SpO2: 93%  98%    Weight: 76.6 kg (168 lb 14 oz)      Height:         Telemetry: normal sinus rhythm    Gen: Well-developed, well-nourished, in no acute distress  Neck: Supple, No JVD, No Carotid Bruit  Resp: No accessory muscle use, Clear breath sounds, No rales or rhonchi  Card: Regular Rate,Rhythm, Normal S1, S2, No murmurs, rubs or gallop. No thrills. Abd:   Soft, non-tender, non-distended, BS+   MSK: No cyanosis  Skin: No rashes    Neuro: Moving all four extremities,in soft restraints  Psych: drowsy  LE: No edema    Data Review:     Radiology:   XR Results (most recent):  Results from Hospital Encounter encounter on 01/22/23    XR PELV AP ONLY    Narrative  Clinical history: Fall/pain  INDICATION:   Fall/pain  COMPARISON: None  FINDINGS:  AP view of the pelvis is obtained. Visualized osseous structures are without evidence of fracture-dislocation. No  significant pelvic abnormality is identified. There is no significant soft tissue abnormality identified. Impression  No acute fracture or dislocation.       Recent Labs     01/24/23  0217 01/23/23  1553    139   K 4.3 3.8    107   CO2 28 27   BUN 18 17   CREA 1.23 1.22   * 128*   CA 8.5 8.6     Recent Labs     01/24/23 0217 01/23/23  1553   WBC 9.7 9.6   HGB 14.1 14.8   HCT 44.0 49.1    304     Recent Labs     01/22/23  2341   AP 89     Recent Labs     01/24/23 0217   CHOL 143   LDLC 75         Current meds:    Current Facility-Administered Medications:     donepeziL (ARICEPT) tablet 10 mg, 10 mg, Oral, DAILY, Hien Harden MD, 10 mg at 01/25/23 1004    latanoprost (XALATAN) 0.005 % ophthalmic solution 1 Drop, 1 Drop, Both Eyes, QHS, Hien Harden MD, 1 Drop at 01/24/23 2121    memantine (NAMENDA) tablet 10 mg, 10 mg, Oral, BID, Marcelina Garcia MD, 10 mg at 01/25/23 1004    metoprolol tartrate (LOPRESSOR) tablet 12.5 mg, 12.5 mg, Oral, Q12H, Marcelina Garcia MD, 12.5 mg at 01/25/23 1004    mirtazapine (REMERON) tablet 7.5 mg, 7.5 mg, Oral, QHS, Hien Harden MD, 7.5 mg at 01/24/23 2120    montelukast (SINGULAIR) tablet 10 mg, 10 mg, Oral, DAILY, Marcelina Garcia MD, 10 mg at 01/25/23 1004    ranolazine ER (RANEXA) tablet 500 mg, 500 mg, Oral, BID, Marcelina Garcia MD, 500 mg at 01/25/23 1005    sertraline (ZOLOFT) tablet 150 mg, 150 mg, Oral, QHS, Ismael Harden MD, 150 mg at 01/24/23 2121    timolol (TIMOPTIC) 0.5 % ophthalmic solution 1 Drop, 1 Drop, Both Eyes, Q12H, Ismael Harden MD, 1 Drop at 01/25/23 1008    acetaminophen (TYLENOL) tablet 650 mg, 650 mg, Oral, Q4H PRN, 650 mg at 01/23/23 1343 **OR** acetaminophen (TYLENOL) solution 650 mg, 650 mg, Per NG tube, Q4H PRN **OR** acetaminophen (TYLENOL) suppository 650 mg, 650 mg, Rectal, Q4H PRN, Marcelina Garcia MD    aspirin chewable tablet 81 mg, 81 mg, Oral, DAILY, Hien Harden MD, 81 mg at 01/25/23 1004    atorvastatin (LIPITOR) tablet 40 mg, 40 mg, Oral, QHS, Hien Harden MD, 40 mg at 01/24/23 2120    glucose chewable tablet 16 g, 4 Tablet, Oral, PRN, Hien Scott MD    glucagon (GLUCAGEN) injection 1 mg, 1 mg, IntraMUSCular, PRN, Hien Harden MD    dextrose 10 % infusion 0-250 mL, 0-250 mL, IntraVENous, PRN, Hien Scott MD    insulin lispro (HUMALOG) injection, , SubCUTAneous, Q6H, Hien Harden MD    famotidine (PEPCID) tablet 20 mg, 20 mg, Oral, DAILY, Hanson, Yoel LEVINE MD, 20 mg at 01/25/23 1004    sodium chloride (NS) flush 5-40 mL, 5-40 mL, IntraVENous, Q8H, yC Nair MD, 10 mL at 01/25/23 0544    sodium chloride (NS) flush 5-40 mL, 5-40 mL, IntraVENous, PRN, MD Cezar Colin, NP    [facility]  Call center: J[address] Lozano [phone]  [phone]      Nii Pavon MD

## 2023-01-25 NOTE — PROGRESS NOTES
Problem: Discharge Planning  Goal: *Discharge to safe environment  1/25/2023 1645 by Isaiah Wei RN  Outcome: Progressing Towards Goal  1/25/2023 1636 by Isaiah Wei RN  Outcome: Progressing Towards Goal  Goal: *Knowledge of medication management  1/25/2023 1645 by Isaiah Wei RN  Outcome: Progressing Towards Goal  1/25/2023 1636 by Isaiah Wei RN  Outcome: Progressing Towards Goal  Goal: *Knowledge of discharge instructions  1/25/2023 1645 by Isaiah Wei RN  Outcome: Progressing Towards Goal  1/25/2023 1636 by Isaiah Wei RN  Outcome: Progressing Towards Goal     Problem: Patient Education: Go to Patient Education Activity  Goal: Patient/Family Education  1/25/2023 1645 by Isaiah Wei RN  Outcome: Progressing Towards Goal  1/25/2023 1636 by Isaiah Wei RN  Outcome: Progressing Towards Goal     Problem: Discharge Planning  Goal: *Discharge to safe environment  1/25/2023 1645 by Isaiah Wei RN  Outcome: Progressing Towards Goal  1/25/2023 1636 by Isaiah Wei RN  Outcome: Progressing Towards Goal  Goal: *Knowledge of medication management  1/25/2023 1645 by Isaiah Wei RN  Outcome: Progressing Towards Goal  1/25/2023 1636 by Isaiah Wei RN  Outcome: Progressing Towards Goal  Goal: *Knowledge of discharge instructions  1/25/2023 1645 by Isaiah Wei RN  Outcome: Progressing Towards Goal  1/25/2023 1636 by Isaiah Wei RN  Outcome: Progressing Towards Goal     Problem: Patient Education: Go to Patient Education Activity  Goal: Patient/Family Education  1/25/2023 1645 by Isaiah Wei RN  Outcome: Progressing Towards Goal  1/25/2023 1636 by Isaiah Wei RN  Outcome: Progressing Towards Goal     Problem: Diabetes Self-Management  Goal: *Disease process and treatment process  Description: Define diabetes and identify own type of diabetes; list 3 options for treating diabetes.   1/25/2023 1645 by Isaiah Wei RN  Outcome: Progressing Towards Goal  1/25/2023 1636 by Festus Hoots, RN  Outcome: Progressing Towards Goal  Goal: *Incorporating nutritional management into lifestyle  Description: Describe effect of type, amount and timing of food on blood glucose; list 3 methods for planning meals. 1/25/2023 1645 by Festus Groves RN  Outcome: Progressing Towards Goal  1/25/2023 1636 by Festus Groves RN  Outcome: Progressing Towards Goal  Goal: *Incorporating physical activity into lifestyle  Description: State effect of exercise on blood glucose levels. 1/25/2023 1645 by Festus Groves RN  Outcome: Progressing Towards Goal  1/25/2023 1636 by Festus Groves RN  Outcome: Progressing Towards Goal  Goal: *Developing strategies to promote health/change behavior  Description: Define the ABC's of diabetes; identify appropriate screenings, schedule and personal plan for screenings. 1/25/2023 1645 by Festus Groves RN  Outcome: Progressing Towards Goal  1/25/2023 1636 by Festus Groves RN  Outcome: Progressing Towards Goal  Goal: *Using medications safely  Description: State effect of diabetes medications on diabetes; name diabetes medication taking, action and side effects. 1/25/2023 1645 by Festus Groves RN  Outcome: Progressing Towards Goal  1/25/2023 1636 by Festus Groves RN  Outcome: Progressing Towards Goal  Goal: *Monitoring blood glucose, interpreting and using results  Description: Identify recommended blood glucose targets  and personal targets. 1/25/2023 1645 by Festus Groves RN  Outcome: Progressing Towards Goal  1/25/2023 1636 by Festus Groves RN  Outcome: Progressing Towards Goal  Goal: *Prevention, detection, treatment of acute complications  Description: List symptoms of hyper- and hypoglycemia; describe how to treat low blood sugar and actions for lowering  high blood glucose level.   1/25/2023 1645 by Festus Groves RN  Outcome: Progressing Towards Goal  1/25/2023 1636 by Festus Groves RN  Outcome: Progressing Towards Goal  Goal: *Prevention, detection and treatment of chronic complications  Description: Define the natural course of diabetes and describe the relationship of blood glucose levels to long term complications of diabetes. 1/25/2023 1645 by Peter Scott RN  Outcome: Progressing Towards Goal  1/25/2023 1636 by Peter Scott RN  Outcome: Progressing Towards Goal  Goal: *Developing strategies to address psychosocial issues  Description: Describe feelings about living with diabetes; identify support needed and support network  1/25/2023 1645 by Peter Scott RN  Outcome: Progressing Towards Goal  1/25/2023 1636 by Peter Scott RN  Outcome: Progressing Towards Goal  Goal: *Insulin pump training  1/25/2023 1645 by Peter Scott RN  Outcome: Progressing Towards Goal  1/25/2023 1636 by Peter Scott RN  Outcome: Progressing Towards Goal  Goal: *Sick day guidelines  1/25/2023 1645 by Peter Scott RN  Outcome: Progressing Towards Goal  1/25/2023 1636 by Peter Scott RN  Outcome: Progressing Towards Goal  Goal: *Patient Specific Goal (EDIT GOAL, INSERT TEXT)  1/25/2023 1645 by Peter Scott RN  Outcome: Progressing Towards Goal  1/25/2023 1636 by Peter Scott RN  Outcome: Progressing Towards Goal     Problem: Patient Education: Go to Patient Education Activity  Goal: Patient/Family Education  1/25/2023 1645 by Peter Scott RN  Outcome: Progressing Towards Goal  1/25/2023 1636 by Peter Scott RN  Outcome: Progressing Towards Goal     Problem: Falls - Risk of  Goal: *Absence of Falls  Description: Document Earma Cornelius Fall Risk and appropriate interventions in the flowsheet.   1/25/2023 1645 by Peter Scott RN  Outcome: Progressing Towards Goal  Note: Fall Risk Interventions:  Mobility Interventions: Assess mobility with egress test    Mentation Interventions: Adequate sleep, hydration, pain control    Medication Interventions: Assess postural VS orthostatic hypotension    Elimination Interventions: Bed/chair exit alarm 1/25/2023 1636 by Lizbeth Light RN  Outcome: Progressing Towards Goal  Note: Fall Risk Interventions:  Mobility Interventions: Assess mobility with egress test    Mentation Interventions: Adequate sleep, hydration, pain control    Medication Interventions: Assess postural VS orthostatic hypotension    Elimination Interventions: Bed/chair exit alarm              Problem: Patient Education: Go to Patient Education Activity  Goal: Patient/Family Education  1/25/2023 1645 by Lizbeth Light RN  Outcome: Progressing Towards Goal  1/25/2023 1636 by Lizbeth Light RN  Outcome: Progressing Towards Goal     Problem: Patient Education: Go to Patient Education Activity  Goal: Patient/Family Education  1/25/2023 1645 by Lizbeth Light RN  Outcome: Progressing Towards Goal  1/25/2023 1636 by Lizbeth Light RN  Outcome: Progressing Towards Goal     Problem: Patient Education: Go to Patient Education Activity  Goal: Patient/Family Education  1/25/2023 1645 by Lizbeth Light RN  Outcome: Progressing Towards Goal  1/25/2023 1636 by Lizbeth Light RN  Outcome: Progressing Towards Goal

## 2023-01-25 NOTE — PROGRESS NOTES
[id number] [facility] Adult  Hospitalist Group                                                                                          Hospitalist Progress Note  Yuridia Wilson MD  Answering service: [phone] from in house phone        Date of Service:  2023  NAME:  Alicja Cardona  :  4/3/1934  MRN:  [id number]      Admission Summary:     Alicja Cardona is a 80 y.o. male who presents with fall. Patient presents from [facility] after presumed ground-level fall. He was found on the ground, unknown downtime, noted to have a posterior scalp injury/laceration,  was seen at short [facility] ER and was transferred to [facility], currently resting in bed, complains of a headache    Interval history / Subjective:     Patient is seen and examined. Patient is sleeping wakeful to take, on soft restraint, patient is hard of hearing,     Per report patient was restless. He fell last night while he was trying to go to bathroom. Assessment & Plan:     Fall possible related to hypoglycemia   -MRI of the brain scattered chronic infarction right frontal parietal, left frontal, left occipital with chronic hemosiderin deposition associated. Small chronic infarct of the right basilar ganglia.   No intracranial mass, acute intracranial hemorrhage or evidence of acute infarction  -CT head on  acute ischemia of the left occipital lobe is suspected, possible trace superimposed subarachnoid hemorrhage  -lipid panel normal  -fall precaution   -PT/OT  -Check orthostatic BP   -seen by neurosurgeon  -Neurologist on board    Severe hypoglycemia   -Improved with D5 0.45 normal saline   -resolved, discontinue IV fluids, monitor , finger stick glucose    -patient not on hypoglycemic agent at home  -monitor finger stick glucose     Elevated troponin with hx of CAD s/p PCI in 2016  -no left side chest pain   -possible demand ischemia   -on aspirin, lipitor,   -metoprolol on hold     Severe cardiomyopathy   -LV EF 20-25% moderate global hypokinesis present, abnormal diastolic function    COPD  -stable  -not bronchospastic     Alzheimer dementia with agitation  -stable  -continue home namenda, aricept, seroquel  -fall precaution   -on soft restraint     T2DM  -A1c 6.8  -continue sliding scale and monitor finger stick glucose    Hx of AS  -stable    HTN  -BP soft, metoprolol on hold, monitor BP    HLD  -continue lipitor     Depression   -stable  -continue home zoloft,     -continue support        Code status: Full Code   Prophylaxis: SCD   Care Plan discussed with: Patient, Nurse and CM   Anticipated Disposition: [facility] Problems  Date Reviewed: 10/6/2019            Codes Class Noted POA    SAH (subarachnoid hemorrhage) ([facility] [address] ICD-10-CM: I60.9  ICD-9-CM: 285  1/23/2023 Unknown        Ischemic stroke [facility]) ICD-10-CM: I63.9  ICD-9-CM: 434.91  1/23/2023 Unknown        Fall from ground level ICD-10-CM: Q46.99FO  ICD-9-CM: E888.9  1/23/2023 Unknown         Vital Signs:    Last 24hrs VS reviewed since prior progress note. Most recent are:  Visit Vitals  /80   Pulse 75   Temp 98 °F (36.7 °C)   Resp 18   Ht 6' (1.829 m)   Wt 76.6 kg (168 lb 14 oz)   SpO2 98%   BMI 22.90 kg/m²       No intake or output data in the 24 hours ending 01/25/23 1417       Physical Examination:     I had a face to face encounter with this patient and independently examined them on 1/25/2023 as outlined below:          Constitutional:  No acute distress, cooperative, pleasant    ENT:  Oral mucosa moist, oropharynx benign. Resp:  CTA bilaterally. No wheezing/rhonchi/rales. No accessory muscle use. CV:  Regular rhythm, normal rate, no murmurs, gallops, rubs    GI:  Soft, non distended, non tender. normoactive bowel sounds, no hepatosplenomegaly     Musculoskeletal:  No edema, warm, 2+ pulses throughout    Neurologic:  Sleepy but wakeful, hard of hearing, oriented to place and person, Moves all extremities. CN II-XII reviewed            Data Review:    Review and/or order of clinical lab test  Review and/or order of tests in the radiology section of CPT  Review and/or order of tests in the medicine section of CPT      Labs:     Recent Labs     01/24/23 0217 01/23/23  1553   WBC 9.7 9.6   HGB 14.1 14.8   HCT 44.0 49.1    304       Recent Labs     01/24/23  0217 01/23/23  1553 01/22/23  2341    139 146*   K 4.3 3.8 3.9    107 109*   CO2 28 27 30   BUN 18 17 22*   CREA 1.23 1.22 1.40*   * 128* 76   CA 8.5 8.6 8.8       Recent Labs     01/22/23  2341   ALT 14   AP 89   TBILI 0.3   TP 6.7   ALB 3.0*   GLOB 3.7       No results for input(s): INR, PTP, APTT, INREXT, INREXT in the last 72 hours. No results for input(s): FE, TIBC, PSAT, FERR in the last 72 hours. No results found for: FOL, RBCF   No results for input(s): PH, PCO2, PO2 in the last 72 hours.   Recent Labs     01/23/23 2018 01/23/23  1553   CPK 54 78       Lab Results   Component Value Date/Time    Cholesterol, total 143 01/24/2023 02:17 AM    HDL Cholesterol 40 01/24/2023 02:17 AM    LDL, calculated 75 01/24/2023 02:17 AM    Triglyceride 140 01/24/2023 02:17 AM    CHOL/HDL Ratio 3.6 01/24/2023 02:17 AM     Lab Results   Component Value Date/Time    Glucose (POC) 156 (H) 01/25/2023 11:42 AM    Glucose (POC) 141 (H) 01/25/2023 05:28 AM    Glucose (POC) 171 (H) 01/25/2023 12:56 AM    Glucose (POC) 119 (H) 01/24/2023 04:37 PM    Glucose (POC) 88 01/24/2023 12:11 PM     Lab Results   Component Value Date/Time    Color Yellow 06/19/2018 12:31 PM    Appearance Clear 06/19/2018 12:31 PM    Specific gravity <=1.005 06/19/2018 12:31 PM    pH (UA) 5.0 06/19/2018 12:31 PM    Protein Negative 06/19/2018 12:31 PM    Glucose >=1000 (A) 06/19/2018 12:31 PM    Ketone Negative 06/19/2018 12:31 PM    Bilirubin Negative 06/19/2018 12:31 PM    Urobilinogen 0.2 06/19/2018 12:31 PM    Nitrites Negative 06/19/2018 12:31 PM    Leukocyte Esterase Negative 06/19/2018 12:31 PM         Medications Reviewed:     Current Facility-Administered Medications   Medication Dose Route Frequency    donepeziL (ARICEPT) tablet 10 mg  10 mg Oral DAILY    latanoprost (XALATAN) 0.005 % ophthalmic solution 1 Drop  1 Drop Both Eyes QHS    memantine (NAMENDA) tablet 10 mg  10 mg Oral BID    metoprolol tartrate (LOPRESSOR) tablet 12.5 mg  12.5 mg Oral Q12H    mirtazapine (REMERON) tablet 7.5 mg  7.5 mg Oral QHS    montelukast (SINGULAIR) tablet 10 mg  10 mg Oral DAILY    ranolazine ER (RANEXA) tablet 500 mg  500 mg Oral BID    sertraline (ZOLOFT) tablet 150 mg  150 mg Oral QHS    timolol (TIMOPTIC) 0.5 % ophthalmic solution 1 Drop  1 Drop Both Eyes Q12H    acetaminophen (TYLENOL) tablet 650 mg  650 mg Oral Q4H PRN    Or    acetaminophen (TYLENOL) solution 650 mg  650 mg Per NG tube Q4H PRN    Or    acetaminophen (TYLENOL) suppository 650 mg  650 mg Rectal Q4H PRN    aspirin chewable tablet 81 mg  81 mg Oral DAILY    atorvastatin (LIPITOR) tablet 40 mg  40 mg Oral QHS    glucose chewable tablet 16 g  4 Tablet Oral PRN    glucagon (GLUCAGEN) injection 1 mg  1 mg IntraMUSCular PRN    dextrose 10 % infusion 0-250 mL  0-250 mL IntraVENous PRN    insulin lispro (HUMALOG) injection   SubCUTAneous Q6H    famotidine (PEPCID) tablet 20 mg  20 mg Oral DAILY    sodium chloride (NS) flush 5-40 mL  5-40 mL IntraVENous Q8H    sodium chloride (NS) flush 5-40 mL  5-40 mL IntraVENous PRN     ______________________________________________________________________  EXPECTED LENGTH OF STAY: 4d 16h  ACTUAL LENGTH OF STAY:          2                 Hailey Kaur MD

## 2023-01-25 NOTE — PROCEDURES
PROCEDURE: ROUTINE INPATIENT EEG  NAME:   Kerry Oppenheim  ACCOUNT NUMBER : [de-identified]  MRN:   [id number]  DATE OF SERVICE: 1/24/23    HISTORY/INDICATION: Patient is an 80-year-old male found down at his [facility] with limited history regarding the event. EEG is performed to evaluate for evidence of underlying epilepsy as an etiology.     MEDICATIONS:   Current Facility-Administered Medications   Medication Dose Route Frequency Provider Last Rate Last Admin    donepeziL (ARICEPT) tablet 10 mg  10 mg Oral DAILY Zack Feng MD   10 mg at 01/25/23 1004    latanoprost (XALATAN) 0.005 % ophthalmic solution 1 Drop  1 Drop Both Eyes QHS Zack Feng MD   1 Drop at 01/24/23 2121    memantine (NAMENDA) tablet 10 mg  10 mg Oral BID Zack Feng MD   10 mg at 01/25/23 1754    metoprolol tartrate (LOPRESSOR) tablet 12.5 mg  12.5 mg Oral Q12H Zack Feng MD   12.5 mg at 01/25/23 1004    mirtazapine (REMERON) tablet 7.5 mg  7.5 mg Oral QHS Zack Feng MD   7.5 mg at 01/24/23 2120    montelukast (SINGULAIR) tablet 10 mg  10 mg Oral DAILY Zack Feng MD   10 mg at 01/25/23 1004    ranolazine ER (RANEXA) tablet 500 mg  500 mg Oral BID Zack Feng MD   500 mg at 01/25/23 1005    sertraline (ZOLOFT) tablet 150 mg  150 mg Oral QHS Zack Feng MD   150 mg at 01/24/23 2121    timolol (TIMOPTIC) 0.5 % ophthalmic solution 1 Drop  1 Drop Both Eyes Q12H Zack Feng MD   1 Drop at 01/25/23 1008    acetaminophen (TYLENOL) tablet 650 mg  650 mg Oral Q4H PRN Zack Feng MD   650 mg at 01/23/23 1343    Or    acetaminophen (TYLENOL) solution 650 mg  650 mg Per NG tube Q4H PRN Zack Feng MD        Or    acetaminophen (TYLENOL) suppository 650 mg  650 mg Rectal Q4H PRN Zack Feng MD        aspirin chewable tablet 81 mg  81 mg Oral DAILY Zack Feng MD   81 mg at 01/25/23 1004    atorvastatin (LIPITOR) tablet 40 mg  40 mg Oral QHS Zack Feng MD   40 mg at 01/24/23 2120    glucose chewable tablet 16 g  4 Tablet Oral PRN Urbano Kumar MD        glucagon (GLUCAGEN) injection 1 mg  1 mg IntraMUSCular PRN Urbano Kumar MD        dextrose 10 % infusion 0-250 mL  0-250 mL IntraVENous DWIGHTN Urbano Kumar MD        insulin lispro (HUMALOG) injection   SubCUTAneous Q6H Hien Harden MD        famotidine (PEPCID) tablet 20 mg  20 mg Oral DAILY Xiang Hardy MD   20 mg at 01/25/23 1004    sodium chloride (NS) flush 5-40 mL  5-40 mL IntraVENous Jimmie SMITH MD   10 mL at 01/25/23 1755    sodium chloride (NS) flush 5-40 mL  5-40 mL IntraVENous SOCRATES Man MD           CONDITIONS OF RECORDING: This is a routine 21-channel EEG recording performed in accordance with the international 10-20 system with one channel devoted to limited EKG. This study was done during states of wakefulness and drowsiness. Photic stimulation was performed as an activating procedure. DESCRIPTION:   Upon maximal arousal the posterior dominant rhythm has a frequency of 7 Hz with an amplitude of 20uV. This activity is symmetric over the bilateral posterior derivations and attenuates with eye opening. Photic stimulation did not significantly alter the tracing. The patient becomes drowsy, but deeper stages of sleep are not attained. There are intermittent bursts of high amplitude 2 Hz slowing lasting for 1-2 seconds. No focal abnormalities, epileptiform discharges, or electrographic seizures seen. INTERPRETATION: Abnormal EEG due to 1) theta slowing of the background and 2) bursts of high amplitude delta slowing    CLINICAL CORRELATION: Findings is c/w a mild encephalopathy which can have many etiologies such as toxic, metabolic, or medication effect and could be consistent with patient's age and history of dementia.     Bunny Stephens MD [place]

## 2023-01-25 NOTE — PROGRESS NOTES
Problem: Dysphagia (Adult)  Goal: *Acute Goals and Plan of Care (Insert Text)  Description: Speech Therapy Goals:  1. Patient will tolerate least restrictive diet without clinical s/s of aspiration or respiratory decline within seven days. Goal initiated 1/23/23. Outcome: Progressing Towards Goal     SPEECH LANGUAGE PATHOLOGY DYSPHAGIA TREATMENT  Patient: Lauren Darby (11 y.o. male)  Date: 1/25/2023  Diagnosis: Ischemic stroke [facility]) [I63.9]  Fall from ground level [W18.30XA]  SAH (subarachnoid hemorrhage) ([facility] [address] [I60.9] <principal problem not specified>      Precautions: aspiration      ASSESSMENT:  Pt repositioned upright in bed, denies any pain and agreeable to session. Pt is hard of hearing. RN reports mild throat clearing this morning with pill administration. Pt is in restraints today. MRI 1/23: scattered chronic infarction right frontal parietal, left frontal, left occipital with chronic hemosiderin deposition associated. Small chronic infarct of the right basilar ganglia. No intracranial mass, acute intracranial hemorrhage or evidence of acute infarction. Pt observed with thin liquids, applesauce and cracker. Pt with munch/rotary chew pattern due to poor dentition, however, without residue after the swallow and overall functional.  Pt without s/s of aspiration on any consistency. Recommend continuing with a regular diet and thin liquids with general aspiration and reflux precautions. SLP will follow up for diet tolerance. PLAN:  Recommendations and Planned Interventions:  Continue regular diet and thin liquids  Oral medications as tolerated  Aspiration precautions: Feed only when alert and participatory, upright for PO intake, small bites/sips, and encourage self-feeding  SLP will follow for diet tolerance    Patient continues to benefit from skilled intervention to address the above impairments. Continue treatment per established plan of care. Discharge Recommendations:   To Be Determined     SUBJECTIVE:   Patient stated Antonio Sings. OBJECTIVE:   Cognitive and Communication Status:  Neurologic State: Alert, Confused  Orientation Level: Oriented to person, Oriented to situation  Cognition: Decreased attention/concentration  Perception: Appears intact  Perseveration: No perseveration noted  Safety/Judgement: Decreased awareness of environment, Decreased awareness of need for assistance, Decreased awareness of need for safety, Decreased insight into deficits  Dysphagia Treatment:  Oral Assessment:  Oral Assessment  Dentition: Natural;Limited (scatterred lower, no upper dentition)  Oral Hygiene: clear and moist  P.O. Trials:  Patient Position: repositioned upright in bed  Vocal quality prior to P.O.: No impairment  Consistency Presented: Puree; Solid; Thin liquid  How Presented: SLP-fed/presented;Cup/sip;Straw;Spoon     Bolus Acceptance: No impairment  Bolus Formation/Control: No impairment  Type of Impairment: Mastication  Propulsion: No impairment  Oral Residue: None  Initiation of Swallow: No impairment  Laryngeal Elevation: Functional  Aspiration Signs/Symptoms: None    Pain:  Pain Scale 1: Numeric (0 - 10)  Pain Intensity 1: 0       After treatment:   Patient left in no apparent distress in bed, Call bell within reach, and Nursing notified    COMMUNICATION/EDUCATION:   Patient was educated regarding his deficit(s) of swallowing as this relates to his diagnosis of SAH. He demonstrated Fair understanding as evidenced by responses. The patient's plan of care including recommendations, planned interventions, and recommended diet changes were discussed with: Registered nurse.      Gerri Ross SLP  Time Calculation: 9 mins

## 2023-01-26 VITALS
RESPIRATION RATE: 18 BRPM | BODY MASS INDEX: 22.87 KG/M2 | HEART RATE: 65 BPM | SYSTOLIC BLOOD PRESSURE: 104 MMHG | HEIGHT: 72 IN | DIASTOLIC BLOOD PRESSURE: 84 MMHG | WEIGHT: 168.87 LBS | OXYGEN SATURATION: 97 % | TEMPERATURE: 98.1 F

## 2023-01-26 LAB
ALBUMIN SERPL-MCNC: 2.7 G/DL (ref 3.5–5)
ALBUMIN/GLOB SERPL: 0.9 (ref 1.1–2.2)
ALP SERPL-CCNC: 85 U/L (ref 45–117)
ALT SERPL-CCNC: 15 U/L (ref 12–78)
ANION GAP SERPL CALC-SCNC: 5 MMOL/L (ref 5–15)
AST SERPL-CCNC: 20 U/L (ref 15–37)
BILIRUB SERPL-MCNC: 0.7 MG/DL (ref 0.2–1)
BUN SERPL-MCNC: 18 MG/DL (ref 6–20)
BUN/CREAT SERPL: 16 (ref 12–20)
CALCIUM SERPL-MCNC: 8.7 MG/DL (ref 8.5–10.1)
CHLORIDE SERPL-SCNC: 112 MMOL/L (ref 97–108)
CO2 SERPL-SCNC: 24 MMOL/L (ref 21–32)
CREAT SERPL-MCNC: 1.14 MG/DL (ref 0.7–1.3)
ERYTHROCYTE [DISTWIDTH] IN BLOOD BY AUTOMATED COUNT: 13.6 % (ref 11.5–14.5)
GLOBULIN SER CALC-MCNC: 3 G/DL (ref 2–4)
GLUCOSE BLD STRIP.AUTO-MCNC: 115 MG/DL (ref 65–117)
GLUCOSE BLD STRIP.AUTO-MCNC: 158 MG/DL (ref 65–117)
GLUCOSE SERPL-MCNC: 140 MG/DL (ref 65–100)
HCT VFR BLD AUTO: 44.4 % (ref 36.6–50.3)
HGB BLD-MCNC: 14.2 G/DL (ref 12.1–17)
MCH RBC QN AUTO: 28.9 PG (ref 26–34)
MCHC RBC AUTO-ENTMCNC: 32 G/DL (ref 30–36.5)
MCV RBC AUTO: 90.4 FL (ref 80–99)
NRBC # BLD: 0 K/UL (ref 0–0.01)
NRBC BLD-RTO: 0 PER 100 WBC
PLATELET # BLD AUTO: 279 K/UL (ref 150–400)
PMV BLD AUTO: 11.4 FL (ref 8.9–12.9)
POTASSIUM SERPL-SCNC: 4.4 MMOL/L (ref 3.5–5.1)
PROT SERPL-MCNC: 5.7 G/DL (ref 6.4–8.2)
RBC # BLD AUTO: 4.91 M/UL (ref 4.1–5.7)
SERVICE CMNT-IMP: ABNORMAL
SERVICE CMNT-IMP: NORMAL
SODIUM SERPL-SCNC: 141 MMOL/L (ref 136–145)
WBC # BLD AUTO: 12.8 K/UL (ref 4.1–11.1)

## 2023-01-26 PROCEDURE — 85027 COMPLETE CBC AUTOMATED: CPT

## 2023-01-26 PROCEDURE — 82962 GLUCOSE BLOOD TEST: CPT

## 2023-01-26 PROCEDURE — 74011000250 HC RX REV CODE- 250: Performed by: EMERGENCY MEDICINE

## 2023-01-26 PROCEDURE — 74011250637 HC RX REV CODE- 250/637: Performed by: FAMILY MEDICINE

## 2023-01-26 PROCEDURE — 36415 COLL VENOUS BLD VENIPUNCTURE: CPT

## 2023-01-26 PROCEDURE — 80053 COMPREHEN METABOLIC PANEL: CPT

## 2023-01-26 RX ORDER — ATORVASTATIN CALCIUM 20 MG/1
40 TABLET, FILM COATED ORAL DAILY
Qty: 60 TABLET | Refills: 0 | Status: SHIPPED
Start: 2023-01-26

## 2023-01-26 RX ADMIN — MEMANTINE 10 MG: 5 TABLET ORAL at 10:15

## 2023-01-26 RX ADMIN — METOPROLOL TARTRATE 12.5 MG: 25 TABLET, FILM COATED ORAL at 10:15

## 2023-01-26 RX ADMIN — SODIUM CHLORIDE, PRESERVATIVE FREE 10 ML: 5 INJECTION INTRAVENOUS at 06:00

## 2023-01-26 RX ADMIN — ASPIRIN 81 MG: 81 TABLET, CHEWABLE ORAL at 10:15

## 2023-01-26 RX ADMIN — TIMOLOL MALEATE 1 DROP: 5 SOLUTION OPHTHALMIC at 10:16

## 2023-01-26 RX ADMIN — DONEPEZIL HYDROCHLORIDE 10 MG: 10 TABLET, FILM COATED ORAL at 10:15

## 2023-01-26 RX ADMIN — MONTELUKAST 10 MG: 10 TABLET, FILM COATED ORAL at 10:15

## 2023-01-26 RX ADMIN — FAMOTIDINE 20 MG: 20 TABLET, FILM COATED ORAL at 10:15

## 2023-01-26 RX ADMIN — ACETAMINOPHEN 650 MG: 325 TABLET ORAL at 10:18

## 2023-01-26 RX ADMIN — RANOLAZINE 500 MG: 500 TABLET, FILM COATED, EXTENDED RELEASE ORAL at 10:16

## 2023-01-26 NOTE — PROGRESS NOTES
Problem: Discharge Planning  Goal: *Discharge to safe environment  1/26/2023 1356 by Marisabel Jolly RN  Outcome: Resolved/Met  1/26/2023 1356 by Marisabel Jolly RN  Outcome: Resolved/Met  Goal: *Knowledge of medication management  1/26/2023 1356 by Marisabel Jolly RN  Outcome: Resolved/Met  1/26/2023 1356 by Marisabel Jolly RN  Outcome: Resolved/Met  Goal: *Knowledge of discharge instructions  1/26/2023 1356 by Marisabel Jolly RN  Outcome: Resolved/Met  1/26/2023 1356 by Marisabel Jolly RN  Outcome: Resolved/Met     Problem: Falls - Risk of  Goal: *Absence of Falls  Description: Document María Dcs Fall Risk and appropriate interventions in the flowsheet. 1/26/2023 1356 by Marisabel Jolly RN  Outcome: Resolved/Met  Note: Fall Risk Interventions:  Mobility Interventions: Assess mobility with egress test    Mentation Interventions: Adequate sleep, hydration, pain control    Medication Interventions: Assess postural VS orthostatic hypotension    Elimination Interventions: Bed/chair exit alarm           1/26/2023 1356 by Marisabel Jolly RN  Outcome: Resolved/Met  Note: Fall Risk Interventions:  Mobility Interventions: Assess mobility with egress test    Mentation Interventions: Adequate sleep, hydration, pain control    Medication Interventions: Assess postural VS orthostatic hypotension    Elimination Interventions: Bed/chair exit alarm              Problem: Pressure Injury - Risk of  Goal: *Prevention of pressure injury  Description: Document Reji Scale and appropriate interventions in the flowsheet.   Outcome: Resolved/Met

## 2023-01-26 NOTE — DISCHARGE SUMMARY
Discharge Instructions/Summary     Patient: Jonatan Yost       MRN: [id number]       YOB: 1934       Age: 80 y.o. Date of admission:  1/22/2023    Date of discharge:  1/26/2023    Primary care provider:  Claudia Hoang MD     Admitting provider:  Rehan Ozuna MD    Discharging provider:  Hadley Muir MD , to contact this individual call [phone] and ask the  to page, if unavailable ask for the triage hospitalist to be paged. Consultations  IP CONSULT TO HOSPITALIST  IP CONSULT TO NEUROSURGERY  IP CONSULT TO NEUROLOGY  IP CONSULT TO CARDIOLOGY    Procedures/Surgeries  * No surgery found *    Discharge destination: [address] [facility]. The patient is stable for discharge. Admission diagnosis  Ischemic stroke [facility]) [I63.9]  Fall from ground level [W18.30XA]  SAH (subarachnoid hemorrhage) ([facility] [address] [I60.9]      [address] COURSE:  Jonatan Yost is a 80 y.o. male who presents with fall. Patient presents from [address] [place] [address] after presumed ground-level fall. He was found on the ground, unknown downtime, noted to have a posterior scalp injury/laceration,  was seen at [facility] ER and was transferred to [facility], currently resting in bed, complains of a headache    Fall possible related to hypoglycemia   -MRI of the brain scattered chronic infarction right frontal parietal, left frontal, left occipital with chronic hemosiderin deposition associated. Small chronic infarct of the right basilar ganglia.   No intracranial mass, acute intracranial hemorrhage or evidence of acute infarction  -CT head on 1/23 acute ischemia of the left occipital lobe is suspected, possible trace superimposed subarachnoid hemorrhage  -lipid panel normal  -fall precaution   -appreciate neurosurgeon and neurology input      Severe hypoglycemia   -Improved with D5 0.45 normal saline   -resolved, discontinue IV fluids, monitor , finger stick glucose       Elevated troponin with hx of CAD s/p PCI in 2016  -no left side chest pain   -possible demand ischemia   -on aspirin, lipitor, metoprolol     Severe cardiomyopathy   -LV EF 20-25% moderate global hypokinesis present, abnormal diastolic function     COPD  -stable  -not bronchospastic      Alzheimer dementia with agitation  -stable  -continue home namenda, aricept, seroquel     T2DM  -A1c 6.8  -continue sliding scale and monitor finger stick glucose     Hx of AS  -stable     HTN  -BP soft, metoprolol on hold, monitor BP     HLD  -continue lipitor      Depression   -stable  -continue home zoloft,     -continue support     Discharge plan discussed with grandson Larayne Meckel on phone. He understood and agreed     Current Discharge Medication List        CONTINUE these medications which have CHANGED    Details   atorvastatin (LIPITOR) 20 mg tablet Take 2 Tablets by mouth daily. Qty: 60 Tablet, Refills: 0  Start date: 1/26/2023           CONTINUE these medications which have NOT CHANGED    Details   acetaminophen (TYLENOL) 325 mg tablet Take  by mouth every four (4) hours as needed for Pain. aspirin 81 mg chewable tablet Take 81 mg by mouth daily. vitamin D3-vitamin K2, MK4, 1,000-100 unit-mcg tab Take  by mouth. simethicone (Gas Relief, simethicone,) 80 mg chewable tablet Take 80 mg by mouth every six (6) hours as needed for Flatulence. glucagon (Glucagon Emergency Kit, human,) 1 mg solr injection by IntraVENous route once. umeclidinium (Incruse Ellipta) 62.5 mcg/actuation inhaler Take 1 Puff by inhalation daily. insulin lispro (HUMALOG) 100 unit/mL kwikpen by SubCUTAneous route. mirtazapine (REMERON) 7.5 mg tablet Take 7.5 mg by mouth nightly. ranolazine ER (RANEXA) 500 mg SR tablet Take  by mouth two (2) times a day.       mineral oil/petrolatum,white (REFRESH LACRI-LUBE OP) Apply  to eye.      memantine (NAMENDA) 10 mg tablet Take 10 mg by mouth two (2) times a day. montelukast (SINGULAIR) 10 mg tablet Take 10 mg by mouth daily. metoprolol tartrate (LOPRESSOR) 25 mg tablet Take 0.5 Tabs by mouth every twelve (12) hours. Qty: 60 Tab, Refills: 0      donepezil (ARICEPT) 5 mg tablet Take 10 mg by mouth daily. timolol (TIMOPTIC OCUDOSE) 0.5 % Dpet Administer 1 Drop to both eyes two (2) times a day. sertraline (ZOLOFT) 100 mg tablet Take 150 mg by mouth nightly. latanoprost (XALATAN) 0.005 % ophthalmic solution Administer 1 Drop to both eyes nightly. rOPINIRole (REQUIP) 0.25 mg tablet Take  by mouth three (3) times daily. STOP taking these medications       insulin glargine-yfgn (Semglee,insulin glarg-yfgn,Pen) 100 unit/mL (3 mL) inpn Comments:   Reason for Stopping:                 FOLLOW-UP CARE RECOMMENDATIONS/TESTING/NURSING ORDERS:  PT/OT  Monitor Blood glucose       DIET:  Diabetic Diet    ACTIVITY:  Activity as tolerated, PT/OT to evaluate and treat, and OOB for meals    APPOINTMENTS:  Follow-up with primary care provider, Dr. Claudia Hoang MD  -  Please call to set up an appointment to be seen in  1 week       PENDING TEST RESULTS:  At the time of discharge the following test results are still pending: none . Please review these results as they become available. Specific symptoms to watch for: chest pain, shortness of breath, fever, chills, nausea, vomiting, diarrhea, change in mentation, falling, weakness, bleeding.       SOCIAL HISTORY:     Social History     Socioeconomic History    Marital status:      Spouse name: Not on file    Number of children: Not on file    Years of education: Not on file    Highest education level: Not on file   Occupational History    Not on file   Tobacco Use    Smoking status: Former     Packs/day: 2.00     Years: 12.00     Pack years: 24.00     Types: Cigarettes     Quit date: 1975     Years since quittin.1 Smokeless tobacco: Never   Substance and Sexual Activity    Alcohol use: Yes     Comment: very moderately    Drug use: No    Sexual activity: Never     Partners: Female   Other Topics Concern    Not on file   Social History Narrative    Not on file     Social Determinants of Health     Financial Resource Strain: Not on file   Food Insecurity: Not on file   Transportation Needs: Not on file   Physical Activity: Not on file   Stress: Not on file   Social Connections: Not on file   Intimate Partner Violence: Not on file   Housing Stability: Not on file       Patient condition at discharge:   Functional status    Poor    X  Deconditioned      Independent   Cognition    Lucid     Forgetful (some sensescence)   X  Dementia   Catheters/lines (plus indication)    Wiggins     PICC      PEG         Code status  X  Full code      DNR         CHRONIC MEDICAL CONDITIONS:  Problem List as of 1/26/2023 Date Reviewed: 10/6/2019            Codes Class Noted - Resolved    Aortic stenosis ICD-10-CM: I35.0  ICD-9-CM: 424.1  10/6/2019 - Present        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.3  ICD-9-CM: 442.2  10/6/2019 - Present        SAH (subarachnoid hemorrhage) ([facility] [address] ICD-10-CM: I60.9  ICD-9-CM: 123  1/23/2023 - Present        Ischemic stroke ([facility] [address] ICD-10-CM: I63.9  ICD-9-CM: 434.91  1/23/2023 - Present        Fall from ground level ICD-10-CM: E16.84RC  ICD-9-CM: E888.9  1/23/2023 - Present        Coronary artery disease ICD-10-CM: I25.10  ICD-9-CM: 414.00  Unknown - Present    Overview Signed 10/6/2019  2:42 PM by Cassi Germain MD     CAD    NUKE 6-17-17 EF 51% normal perfusion  PCI POBA 2-3-16 distal RCA  Cath 6-2008  LM 20; LAD MLI, Circ stent to OM1 patent, RCA MLI  MI and PCI to Circ 2002    A. Cardiac cath: 9/5/07 due to false positive NST: Patent LCx stent with otherwise nonobstructing CAD. EF 55%  B. Negative NST on 01/29/2016  C.  Hx Inferior STEMI s/p cardiac cath: 2/3/16 by Dr. Charissa Price: Severe mid and distal RCA disease with BERLIN 0 flow in prl. Successful PCI of RCA with JÚNIOR  D. ACS s/p cardiac catheterization 2/9/16 by Dr. Dimitry Romero: patent RCA stents. Non-obstructive CAD LCx and LAD-unchanged from cardiac catheterization 2/3/16    E. C 9/12/16, Dr. Higinio Gomez, [facility]: Patent RCA stents and LCx stent with mild-moderate/medically-treated CAD             Elevated cholesterol ICD-10-CM: E78.00  ICD-9-CM: 272.0  Unknown - Present        Aneurysm of aorta ([facility] [address] ICD-10-CM: I71.9  ICD-9-CM: 441.9  Unknown - Present        copd ICD-10-CM: J43.8  ICD-9-CM: 492.8  10/6/2019 - Present        Hypotension ICD-10-CM: I95.9  ICD-9-CM: 458.9  6/14/2017 - Present        Symptomatic bradycardia ICD-10-CM: R00.1  ICD-9-CM: 427.89  6/14/2017 - Present        Unstable angina ([facility] [address] ICD-10-CM: I20.0  ICD-9-CM: 411.1  9/9/2016 - Present        Chest pain ICD-10-CM: R07.9  ICD-9-CM: 786.50  6/21/2016 - Present        Adenomatous colon polyp ICD-10-CM: D12.6  ICD-9-CM: 211.3  11/3/2014 - Present        Phimosis ICD-10-CM: N47.1  ICD-9-CM: 479  1/15/2014 - Present        Lower urinary tract symptoms (LUTS) ICD-10-CM: R39.9  ICD-9-CM: 788.99  5/30/2013 - Present        Abdominal aneurysm without mention of rupture ICD-10-CM: I71.40  ICD-9-CM: 441.4  7/25/2012 - Present        HTN (hypertension) ICD-10-CM: I10  ICD-9-CM: 401.9  7/25/2012 - Present        DM (diabetes mellitus) type II uncontrolled, periph vascular disorder ICD-10-CM: ZHI3047  ICD-9-CM: 250.72, 443.81  7/25/2012 - Present               Physical examination at discharge  Visit Vitals  /77   Pulse 93   Temp 98.1 °F (36.7 °C)   Resp 16   Ht 6' (1.829 m)   Wt 76.6 kg (168 lb 14 oz)   SpO2 94%   BMI 22.90 kg/m²                                          Constitutional:  No acute distress, cooperative, pleasant    ENT:  Oral mucosa moist, oropharynx benign. Resp:  CTA bilaterally. No wheezing/rhonchi/rales. No accessory muscle use.     CV:  Regular rhythm, normal rate, no murmurs, gallops, rubs GI:  Soft, non distended, non tender. normoactive bowel sounds, no hepatosplenomegaly     Musculoskeletal:  No edema, warm, 2+ pulses throughout    Neurologic:  Sleepy but wakeful, hard of hearing, oriented to place and person, Moves all extremities. CN II-XII reviewed       Significant Diagnostic Studies:   1/22/2023: BUN 22 MG/DL (H; Ref range: 6 - 20 MG/DL); Calcium 8.8 MG/DL (Ref range: 8.5 - 10.1 MG/DL); CO2 30 mmol/L (Ref range: 21 - 32 mmol/L); Creatinine 1.40 MG/DL (H; Ref range: 0.70 - 1.30 MG/DL); Glucose 76 mg/dL (Ref range: 65 - 100 mg/dL); HCT 45.8 % (Ref range: 36.6 - 50.3 %); HGB 14.5 g/dL (Ref range: 12.1 - 17.0 g/dL); Potassium 3.9 mmol/L (Ref range: 3.5 - 5.1 mmol/L); Sodium 146 mmol/L (H; Ref range: 136 - 145 mmol/L)  1/23/2023: BUN 17 MG/DL (Ref range: 6 - 20 MG/DL); Calcium 8.6 MG/DL (Ref range: 8.5 - 10.1 MG/DL); CO2 27 mmol/L (Ref range: 21 - 32 mmol/L); Creatinine 1.22 MG/DL (Ref range: 0.70 - 1.30 MG/DL); Glucose 128 mg/dL (H; Ref range: 65 - 100 mg/dL); HCT 49.1 % (Ref range: 36.6 - 50.3 %); HGB 14.8 g/dL (Ref range: 12.1 - 17.0 g/dL); Potassium 3.8 mmol/L (Ref range: 3.5 - 5.1 mmol/L); Sodium 139 mmol/L (Ref range: 136 - 145 mmol/L)  Recent Labs     01/26/23 0320 01/24/23 0217   WBC 12.8* 9.7   HGB 14.2 14.1   HCT 44.4 44.0    300     Recent Labs     01/26/23  0320 01/24/23 0217 01/23/23  1553    138 139   K 4.4 4.3 3.8   * 107 107   CO2 24 28 27   BUN 18 18 17   CREA 1.14 1.23 1.22   * 124* 128*   CA 8.7 8.5 8.6     Recent Labs     01/26/23  0320   AP 85   TP 5.7*   ALB 2.7*   GLOB 3.0     No results for input(s): INR, PTP, APTT, INREXT in the last 72 hours. No results for input(s): FE, TIBC, PSAT, FERR in the last 72 hours. No results for input(s): PH, PCO2, PO2 in the last 72 hours.   Recent Labs     01/23/23 2018 01/23/23  1553   CPK 54 78     No components found for: [place]    Pertinent imaging studies:    Per EMR       Time spent on discharge related activities today greater than 30 minutes.       Signed:  Danna Snyder MD                 Hospitalist                 1/26/2023                 11:11 AM        Cc: Jose Coulter MD

## 2023-01-26 NOTE — DISCHARGE INSTRUCTIONS
Patient Discharge Instructions    Elia Campbell / [id number] : 4/3/1934    Admitted 2023 Discharged: 2023 11:00 AM     Discharging provider: Ange López MD. To contact this provider call [phone] and ask  to page, if not available ask for triage hospitalist to be paged. Primary care provider: @PCP@  . . . . . . . . . . . . . . . . . . . . . . . . . . . . . . . . . . . . . . . . . . . . . . . . . . . . . . . . . . . . . . . . . . . . . . . [place]  [address] COURSE:    Fall possible related to hypoglycemia   -MRI of the brain scattered chronic infarction right frontal parietal, left frontal, left occipital with chronic hemosiderin deposition associated. Small chronic infarct of the right basilar ganglia.   No intracranial mass, acute intracranial hemorrhage or evidence of acute infarction  -CT head on  acute ischemia of the left occipital lobe is suspected, possible trace superimposed subarachnoid hemorrhage  -lipid panel normal  - appreciate neurosurgeon and neurologist input     Severe hypoglycemia   -Improved with D5 0.45 normal saline   -resolved, discontinue IV fluids, monitor , finger stick glucose       Elevated troponin with hx of CAD s/p PCI in 2016  -no left side chest pain   -possible demand ischemia   -on aspirin, lipitor, metoprolol      Severe cardiomyopathy   -LV EF 20-25% moderate global hypokinesis present, abnormal diastolic function     COPD  -stable  -not bronchospastic      Alzheimer dementia with agitation  -continue home namenda, aricept, seroquel  -off soft restraint      T2DM  -A1c 6.8  -continue sliding scale and monitor finger stick glucose     Hx of AS  -stable     HTN  -c/w metoprolol      HLD  -continue lipitor      Depression   -stable  -continue home zoloft,     -continue support       FOLLOW-UP CARE RECOMMENDATIONS/TESTING/NURSING ORDERS:  Monitor blood glucose       DIET:  Diabetic Diet    ACTIVITY:  Activity as tolerated, PT/OT to evaluate and treat, and OOB for meals    APPOINTMENTS:  Follow-up with primary care provider,  @[email or web address]@  -  Please call to set up an appointment to be seen in  1 week     It is very important that you keep follow-up appointment(s). Bring discharge papers, medication list (and/or medication bottles) to follow-up appointments for review by outpatient provider(s). PENDING TEST RESULTS:  At the time of discharge the following test results are still pending: none . Please review these results as they become available. Specific symptoms to watch for: chest pain, shortness of breath, fever, chills, nausea, vomiting, diarrhea, change in mentation, falling, weakness, bleeding. Patient condition at discharge:   Functional status  X  Poor      Deconditioned      Independent   Cognition    Lucid     Forgetful (some sensescence)   X  Dementia   Catheters/lines (plus indication)    Wiggins     PICC      PEG         Code status  X  Full code      DNR         CHRONIC MEDICAL CONDITIONS:  [unfilled]      Information obtained by :   I understand that if any problems occur once I am at home I am to contact my physician. I understand and acknowledge receipt of the instructions indicated above.                                                                                                                                              Physician's or R.N.'s Signature                                                                  Date/Time                                                                                                                                              Patient or Representative Signature                                                          Date/Time

## 2023-01-26 NOTE — PROGRESS NOTES
Transition of Care Plan: SNF-resides at [facility]  RN to call report to: [phone]     RUR: 10% low     PCP F/U: Torin Childers MD     Disposition: SNF-resides at [facility]     Transportation: [facility]-1:30pm     Main Contact: Yasmani: Aki Chin: [phone]    9794: Message sent to [facility] and [address] to see if patient can come today. 1054: Spoke to EdÃºkame. Patient can come back at any time. RN and MD notified. Yasmani notified of discharge and is agreeable. Notified of IMM letter. Teddy Santiago RN, CRM    Medicare pt has received, reviewed, and signed 2nd IM letter informing them of their right to appeal the discharge. Signed copy has been placed on pt bedside chart. Transition of Care Plan to SNF/Rehab    SNF/Rehab Transition:  Patient has been accepted to [facility] and meets criteria for admission. Patient will transported by [facility] and expected to leave at 1:30pm.    Communication to Patient/Family:  Met with patient and Tyshawn (identified care giver) and they are agreeable to the transition plan. Communication to SNF/Rehab:  Bedside RN, [facility], has been notified to update the transition plan to the facility and call report (phone number [phone]). Discharge information has been updated on the AVS.     Discharge instructions to be sent to facility    Nursing Please include all hard scripts for controlled substances, med rec and dc summary, and AVS in packet.      Reviewed and confirmed with facility, [facility] , can manage the patient care needs for the following:     [place] with (X) only those applicable:    Medication:  [x]  Medications will be available at the facility  []  IV Antibiotics   []  Controlled Substance - hard copy to be sent with patient   [x]  Weekly Labs   Documents:  [] Hard RX  [x] MAR  [x] Kardex  [x] AVS  []Transfer Summary  [x]Discharge Equipment:  []  CPAP/BiPAP  []  Wound Vacuum  []  Wiggins or Urinary Device  []  PICC/Central Line  []  Nebulizer  []  Ventilator   Treatment:  []Isolation (for MRSA, VRE, etc.)  []Surgical Drain Management  []Tracheostomy Care  []Dressing Changes  []Dialysis with transportation and chair time   []PEG Care  []Oxygen  []Daily Weights for Heart Failure   Dietary:  []Any diet limitations  []Tube Feedings   []Total Parenteral Management (TPN)   Eligible for Medicaid Long Term Services and Supports  Yes:  [] Eligible for medical assistance or will become eligible within 180 days and UAI completed. [] Provider/Patient and/or support system has requested screening. [] UAI copy provided to patient or responsible party  [] UAI unavailable at discharge will send once processed to SNF provider. [] UAI unavailable at discharged mailed to patient  No:   [] Private pay and is not financially eligible for Medicaid within the next 180 days. [] Reside out-of-state.   [] A residents of a state owned/operated facility that is licensed  by [address] [facility] or [facility]  [] Enrollment in [facility] hospice services  []  Medical [address]  [] Patient /Family declines to have screening completed or provide financial information for screening     Financial Resources:  Medicaid    [] Initiated and application pending   [] Full coverage     Advanced Care Plan:  []Surrogate Decision Maker of Care  []POA  [x]Communicated Code Status-Full Code   Other

## 2023-01-30 NOTE — PROGRESS NOTES
Physician Progress Note      PATIENT:               Teddy JEFFRIES  CSN #:                  [id number]  :                       4/3/1934  ADMIT DATE:       2023 10:56 PM  [address] [place] DATE:        2023 2:45 PM  RESPONDING  PROVIDER #:        Nadia Ochoa MD          QUERY TEXT:    Dear Attending,    Patient admitted with [address] In order to support the diagnosis of SAH, please include additional clinical indicators in your documentation. Or please document if the diagnosis of SAH has been ruled out after further study. The medical record reflects the following:  Risk Factors: Fall, CAD, HTN, HLD,  Clinical Indicators: Troponin elevated 1,467 and 2,497,  echo estimated EF of 20 - 25%, Abnormal ECG, CT head showed concern for L occipital hypodensity with concern for \"trace superimposed SAH\". MRI No intracranial mass, acute intracranial hemorrhage or evidence of acute infarction. Treatment: aspirin, lipitor, metoprolol,? Cardiology consulted, ECHO, monitor troponin, PT/OT, Neurologist on board, Check orthostatic BP, IVFs,    Please call if you have any questions or need assistance. I can also be reached via [employer] or [facility] # [phone]. Thank you,  Raeann Dickinson, [facility]  O: [phone]  Options provided:  -- Nontraumatic SAH present as evidenced by, Please document evidence. -- traumatic SAH present as evidenced by, Please document evidence. -- SAH was ruled out  -- Other - I will add my own diagnosis  -- Disagree - Not applicable / Not valid  -- Disagree - Clinically unable to determine / Unknown  -- Refer to Clinical Documentation Reviewer    PROVIDER RESPONSE TEXT:    SAH was ruled out after study. Query created by:  Jonathan Potter on 2023 3:51 PM      Electronically signed by:  Nadia Ochoa MD 2023 4:10 PM

## 2023-01-30 NOTE — PROGRESS NOTES
Physician Progress Note      PATIENT:               Mason JEFFRIES  CSN #:                  [id number]  :                       4/3/1934  ADMIT DATE:       2023 10:56 PM  [address] DATE:        2023 2:45 PM  RESPONDING  PROVIDER #:        Marcio Reina MD          QUERY TEXT:    Dear Attending,    Patient admitted with [address] Noted documentation of NSTEMI in Cardiology consult note . In order to support the diagnosis of NSTEMI, please include additional clinical indicators in your documentation. Or please document if the diagnosis of NSTEMI has been ruled out after further study. The medical record reflects the following:  Risk Factors: Fall, CAD, HTN, HLD,  Clinical Indicators: Troponin elevated 1,467 and 2,497,  echo estimated EF of 20 - 25%, Abnormal ECG, CT head showed concern for L occipital hypodensity with concern for \"trace superimposed SAH\". Treatment: aspirin, lipitor, metoprolol,? Cardiology consulted, ECHO, monitor troponin,    Please call if you have any questions or need assistance. I can also be reached via [facility] or [facility] # [phone]. Thank you,  Anusha Delgadillo, [facility]  O: [phone]  Options provided:  -- NSTEMI present as evidenced by, Please document evidence. -- NSTEMI was ruled out  -- Other - I will add my own diagnosis  -- Disagree - Not applicable / Not valid  -- Disagree - Clinically unable to determine / Unknown  -- Refer to Clinical Documentation Reviewer    PROVIDER RESPONSE TEXT:    NSTEMI was ruled out after study. Query created by:  Fabricio Boogie on 2023 3:44 PM      Electronically signed by:  Marcio Reina MD 2023 3:50 PM

## 2023-02-25 DIAGNOSIS — I50.22 CHRONIC SYSTOLIC HEART FAILURE (HCC): ICD-10-CM

## 2023-03-20 PROBLEM — E87.20 LACTIC ACIDOSIS: Status: ACTIVE | Noted: 2023-01-01

## 2023-03-20 PROBLEM — R09.02 HYPOXIA: Status: ACTIVE | Noted: 2023-01-01

## 2023-03-20 NOTE — ED NOTES
Bedside and Verbal shift change report given to Damián, [facility] (oncoming nurse) by Willem Jarrett RN (offgoing nurse). Report included the following information SBAR, Kardex, ED Summary, [facility] and Recent Results.

## 2023-03-20 NOTE — PROGRESS NOTES
Famotidine - Renal dosing by Pharmacy  Current regimen:  20 mg IV Q 12 hr  Recent Labs     03/20/23  0726   CREA 2.43*   BUN 35*     Estimated CrCl:  ~15-20 ml/min    Plan:   Change to 20 mg IV Q 24 hr with respect to estimated creatinine clearance (CrCl <50 mL/min) per [facility]/[employer]-approved Renal Dosing Adjustment protocol. Pharmacy will continue to monitor this patient daily for changes in clinical status and renal function.     Please contact pharmacy with any questions/clarifications at .         ------  Famotidine (PO/IV) Renal Dosing:      >50 mL/min - 20 mg Q 12hrs      <50 mL/min - 20 mg Q 24hrs                    HD - 20 mg Q 24hrs               CRRT - n/a

## 2023-03-20 NOTE — H&P
[facility]    ICU TEAM H&P    Name: Antoinette Baird   : 1934   MRN: [id number]   Date: 3/20/2023      3/20/2023      Reason for ICU Admission: Septic shock/cardiogenic shock    HPI:  This is an 51-year-old gentleman with multiple medical problems including Alzheimer's dementia, coronary artery disease with prior stent to the RCA and circumflex, AAA repair in 2006, diabetes mellitus type 2, recent strokes and congestive heart failure with ejection fraction of 20-25% at baseline. He was sent today from the facility he resides at with acute agitation chest pain abdominal pain and hypoxia. Patient is not able to provide history but seems these changes are acute. No reported history of upper respiratory tract infection symptoms, no fever no nausea no vomiting no melena no hematochezia. No history of trauma. Upon arrival to the ED he was agitated, hypoxic but that responded to nonrebreather oxygen mask followed by mid flow nasal cannula. Maintaining good blood pressure but he was initially tachycardic. EKG showed significant ST segment changes and persistent LBBB,  Patient received antibiotic, oxygen therapy, cardiologist evaluated him. There was suspicion for possible aortic aneurysm rupture thus heparin was not started immediately. I evaluated the patient at bedside, I discussed with his legal guardian his grandson Mr. Sukhjinder Brown the current situation and the seriousness of Mr. Kevin Wahl condition. He expressed understanding. We discussed goal of care and CODE STATUS, Mr. Sukhjinder Brown was clear that his grandfather which is his not to receive invasive measure or invasive resuscitation. He stated that the most important thing is his comfort and dignity especially at this age and with the level of dementia he has. Mr. Kevin Wahl had an active lifestyle as he used to be a .  We agreed on continuing medical treatment, no chest compressions no intubation or invasive procedure no pressors.   At at the time if his comfort and dignity are compromised we will ensure that they come first.        Active Problem List:     Problem List  Date Reviewed: 10/6/2019            Codes Class    Aortic stenosis ICD-10-CM: I35.0  ICD-9-CM: 424.1         Iliac aneurysm ([facility] [address] ICD-10-CM: I72.3  ICD-9-CM: 442.2         Lactic acidosis ICD-10-CM: E87.20  ICD-9-CM: 276.2         Hypoxia ICD-10-CM: R09.02  ICD-9-CM: 799.02         SAH (subarachnoid hemorrhage) ([facility] [address] ICD-10-CM: I60.9  ICD-9-CM: 430         Ischemic stroke ([facility] [address] ICD-10-CM: I63.9  ICD-9-CM: 434.91         Fall from ground level ICD-10-CM: F11.78MS  ICD-9-CM: E888.9         Coronary artery disease ICD-10-CM: I25.10  ICD-9-CM: 414.00     Overview Signed 10/6/2019  2:42 PM by Dorina Martinez MD     CAD    NUKE 6-17-17 EF 51% normal perfusion  PCI POBA 2-3-16 distal RCA  Cath 6-2008  LM 20; LAD MLI, Circ stent to OM1 patent, RCA MLI  MI and PCI to Circ 2002    A. Cardiac cath: 9/5/07 due to false positive NST: Patent LCx stent with otherwise nonobstructing CAD. EF 55%  B. Negative NST on 01/29/2016  C. Hx Inferior STEMI s/p cardiac cath: 2/3/16 by Dr. David Whitten: Severe mid and distal RCA disease with BERLIN 0 flow in prl. Successful PCI of RCA with JÚNIOR  D. ACS s/p cardiac catheterization 2/9/16 by Dr. Mike Villalpando: patent RCA stents. Non-obstructive CAD LCx and LAD-unchanged from cardiac catheterization 2/3/16    E. LHC 9/12/16, Dr. Clare Toth, [facility]: Patent RCA stents and LCx stent with mild-moderate/medically-treated CAD             Elevated cholesterol ICD-10-CM: E78.00  ICD-9-CM: 272.0         Aneurysm of aorta (HCC) ICD-10-CM: I71.9  ICD-9-CM: 441.9         copd ICD-10-CM: J43.8  ICD-9-CM: 492.8         Hypotension ICD-10-CM: I95.9  ICD-9-CM: 561. 9         Symptomatic bradycardia ICD-10-CM: R00.1  ICD-9-CM: 427.89         Unstable angina (HCC) ICD-10-CM: I20.0  ICD-9-CM: 411.1         Chest pain ICD-10-CM: R07.9  ICD-9-CM: 786.50         Adenomatous colon polyp ICD-10-CM: D12.6  ICD-9-CM: 211.3         Phimosis ICD-10-CM: N47.1  ICD-9-CM: 12         Lower urinary tract symptoms (LUTS) ICD-10-CM: R39.9  ICD-9-CM: 788.99         Abdominal aneurysm without mention of rupture ICD-10-CM: I71.40  ICD-9-CM: 441.4         HTN (hypertension) ICD-10-CM: I10  ICD-9-CM: 401.9         DM (diabetes mellitus) type II uncontrolled, periph vascular disorder ICD-10-CM: UCU5855  ICD-9-CM: 250.72, 443.81            Past Medical History:      has a past medical history of Acid reflux, Allergic contact dermatitis due to other chemical products, Allergic rhinitis, Alzheimer's disease with late onset (CODE) ([facility] [address] Aneurysm of aorta ([facility] [address] Angina pectoris ([facility] [address] Aortic stenosis (10/06/2019), Atherosclerotic heart disease of native coronary artery without angina pectoris, Cellulitis, Chronic kidney disease, stage 3 ([address] Chronic obstructive pulmonary disease ([address] Coronary artery disease, Dementia ([address] Depression, Diabetes mellitus ([address] Dry eye syndrome of bilateral lacrimal glands, Elevated cholesterol, Generalized anxiety disorder, GERD without esophagitis, Glaucoma, Glaucoma, High triglycerides, [place] (hard of hearing), Hyperlipemia, Hypertension, Iliac aneurysm ([facility] [address] (10/06/2019), Ill-defined condition, Muscle wasting and atrophy, not elsewhere classified, left shoulder, Obstructive sleep apnea, Pain, unspecified, Personal history of COVID-19, Restless legs syndrome, Unspecified sleep apnea, and Urethral stricture. Past Surgical History:      has a past surgical history that includes hx turp (2004); hx aaa repair (12/13/2006 Dr Lawerence Burkitt); hx appendectomy; hx coronary stent placement (2002); colonoscopy (11/3/2014); and colonoscopy (N/A, 2/1/2017). Home Medications:     Prior to Admission medications    Medication Sig Start Date End Date Taking? Authorizing Provider   atorvastatin (LIPITOR) 40 mg tablet Take 40 mg by mouth daily.    Yes Provider, Historical   dextrose 40% (Glucose GeL) 40 % oral gel Take 15 g by mouth as needed for Hypoglycemia. Yes Provider, Historical   famotidine (PEPCID) 20 mg tablet Take 20 mg by mouth nightly. Yes Provider, Historical   acetaminophen (TYLENOL) 325 mg tablet Take 650 mg by mouth every four (4) hours as needed for Pain. Linda Cordon MD   aspirin 81 mg chewable tablet Take 81 mg by mouth daily. Linda Cordon MD   simethicone (Gas Relief, simethicone,) 80 mg chewable tablet Take 80 mg by mouth every six (6) hours as needed for Flatulence. Linda Cordon MD   glucagon (Glucagon Emergency Kit, human,) 1 mg solr injection by IntraVENous route once. Linda Cordon MD   insulin lispro (HUMALOG) 100 unit/mL kwikpen by SubCUTAneous route Before breakfast, lunch, dinner and at bedtime. Sliding scale    Linda Cordon MD   mirtazapine (REMERON) 7.5 mg tablet Take 7.5 mg by mouth nightly. Linda Cordon MD   ranolazine ER (RANEXA) 500 mg SR tablet Take 500 mg by mouth two (2) times a day. Linda Cordon MD   memantine (NAMENDA) 10 mg tablet Take 10 mg by mouth two (2) times a day. Provider, Historical   montelukast (SINGULAIR) 10 mg tablet Take 10 mg by mouth daily. Provider, Historical   donepeziL (ARICEPT) 10 mg tablet Take 10 mg by mouth daily. Provider, Historical   timolol (TIMOPTIC OCUDOSE) 0.5 % Dpet Administer 1 Drop to both eyes two (2) times a day. Provider, Historical   sertraline (ZOLOFT) 100 mg tablet Take 150 mg by mouth nightly. Provider, Historical   latanoprost (XALATAN) 0.005 % ophthalmic solution Administer 1 Drop to both eyes nightly. Provider, Historical       Allergies/Social/Family History:      Allergies   Allergen Reactions    Morphine Rash and Itching     mild    Tuberculin Julia-Ppd Swelling      Social History     Tobacco Use    Smoking status: Former     Packs/day: 2.00     Years: 12.00     Pack years: 24.00     Types: Cigarettes     Quit date: 1975     Years since quittin.2    Smokeless tobacco: Never   Substance Use Topics    Alcohol use: Yes     Comment: very moderately      No family history on file. Review of Systems:     Not able to obtain due to patient medical condition    Objective:   Vital Signs:  Visit Vitals  /84   Pulse 76   Temp 97.8 °F (36.6 °C)   Resp 28   SpO2 97%    O2 Flow Rate (L/min): 10 l/min O2 Device: Hi flow nasal cannula Temp (24hrs), Av.8 °F (35.4 °C), Min:93.5 °F (34.2 °C), Max:97.8 °F (36.6 °C)           Intake/Output:     Intake/Output Summary (Last 24 hours) at 3/20/2023 1331  Last data filed at 3/20/2023 1105  Gross per 24 hour   Intake 100 ml   Output --   Net 100 ml       Physical Exam:    General:  Confused chronically ill looking male in mild to moderate distress   Eyes:  Sclera anicteric. Pupils equally round and reactive to light. Mouth/Throat: Dry mucous membrane   Neck: Supple   Lungs:   Fine crepitation bilaterally   CV:  Regular rate and rhythm,no murmur, click, rub or gallop   Abdomen:   Soft mild tenderness on deep palpation   Extremities: Mottled, good pulses in bilateral lower limb and bilateral arm   Skin: Mottled   Psych: Conscious but confused, moving 4 limbs with equal power, maintain eye contact at times, not following command       LABS AND  DATA: Personally reviewed  Recent Labs     23  0726   WBC 17.6*   HGB 14.0   HCT 46.5        Recent Labs     23  0726      K 5.1   *   CO2 13*   BUN 35*   CREA 2.43*   *   CA 7.9*     Recent Labs     23  0726      TP 5.9*   ALB 2.8*   GLOB 3.1     No results for input(s): INR, PTP, APTT, INREXT in the last 72 hours. Recent Labs     23  0958   PHI 7.33*   PCO2I 19.8*   PO2I 129*   FIO2I 15     No results for input(s): CPK, CKMB, TROIQ, BNPP in the last 72 hours.     Hemodynamics:   PAP:   CO:     Wedge:   CI:     CVP:    SVR:       PVR:       Ventilator Settings:  Mode Rate Tidal Volume Pressure FiO2 PEEP                    Peak airway pressure:      Minute ventilation: MEDS: Reviewed    Chest X-Ray:  CXR Results  (Last 48 hours)                 03/20/23 0741  XR CHEST PORT Final result    Impression:  Bilateral effusions. Narrative:  EXAM:  XR CHEST PORT       INDICATION: Shortness of breath       COMPARISON: None        . TECHNIQUE: portable chest AP view       FINDINGS: The inspiration is shallow with bilateral pleural effusion mild   interstitial edema. The a heart size is prominent. Assessment and Plan:   -Acute coronary syndrome: Non-ST segment elevation myocardial infarction  - Shock: Septic versus cardiogenic  - Possible ischemic colitis  - Severe lactic acidosis  - Acute kidney injury    As discussed above patient is being admitted to the ICU, bicarb drip started antibiotic given oxygen therapy started, CAT scan ordered. Discussion with legal guardian his grandson Mr. Bernie Fenton changed the CODE STATUS to DNR/DNI but we agreed on medical management. I added also narcotics as needed for distress. Unfortunately patient condition deteriorated quickly and he passed away peacefully at 1405. Mr. Bernie Fenton at bedside. DISPOSITION  [facility] [place]    CRITICAL CARE CONSULTANT NOTE  I had a face to face encounter with the patient, reviewed and interpreted patient data including clinical events, labs, images, vital signs, I/O's, and examined patient. I have discussed the case and the plan and management of the patient's care with the consulting services, the bedside nurses and the respiratory therapist.      NOTE OF PERSONAL INVOLVEMENT IN CARE   This patient has a high probability of imminent, clinically significant deterioration, which requires the highest level of preparedness to intervene urgently. I participated in the decision-making and personally managed or directed the management of the following life and organ supporting interventions that required my frequent assessment to treat or prevent imminent deterioration.     I personally spent 50 minutes of critical care time. This is time spent at this critically ill patient's bedside actively involved in patient care as well as the coordination of care and discussions with the patient's family. This does not include any procedural time which has been billed separately. Cathy Ulrich M.D.   Staff Intensivist/Pulmonologist  [facility]  3/20/2023

## 2023-03-20 NOTE — DISCHARGE SUMMARY
Discharge summary    Date of admission 2023  Date of discharge: 2023  Condition:  at 0  Disposition: [facility] notified. Family at [facility] course:  Please see HPI from the same day for details. In short 71-year-old gentleman with multiple medical problem presented with acute hypoxia chest pain and abdominal pain. Was found to be in non-STEMI and with concern for aortic dissection. Medical management initiated and family discussion changed the CODE STATUS to DNR/DNI. Unfortunately patient deteriorated quickly and passed away while still in the ER before completion of work-up.   Mr. Gibbs Blow to his legal guardian and grandson declined autopsy

## 2023-03-20 NOTE — PROGRESS NOTES
Admission Medication Reconciliation:    Information obtained from:  medication list from [address] [place] [address]:  NO    Comments/Recommendations: All medications have been reviewed and updated; all medications, doses, and frequencies were reviewed from medication list from [facility]. Changes made to Prior to Admission (PTA) Medication List:   ?   Medications Added:   - famotidine  - glucose gel   ? Medications Changed:   - acetaminophen, added dose  - ranolazine, added dose     Medications Removed:   - metoprolol tartrate 12.5 mg BID  - refresh eye drops  - ropinirole   - Incruse Ellipta  - vitamin D3, vitamin K, MK4     ¹[employer] pharmacy benefit data reflects medications filled and processed through the patient's insurance, however   this data does NOT capture whether the medication was picked up or is currently being taken by the patient.     Allergies:  Morphine and Tuberculin beatrice-ppd    Significant PMH/Disease States:   Past Medical History:   Diagnosis Date    Acid reflux     Allergic contact dermatitis due to other chemical products     Allergic rhinitis     Alzheimer's disease with late onset (CODE) ([address]     Aneurysm of aorta (HCC)     Angina pectoris ([address]     Aortic stenosis 10/06/2019    Atherosclerotic heart disease of native coronary artery without angina pectoris     Cellulitis     Chronic kidney disease, stage 3 (HCC)     Chronic obstructive pulmonary disease (HCC)     Coronary artery disease     Dementia (HCC)     Depression     Diabetes mellitus ([address]     Dry eye syndrome of bilateral lacrimal glands     Elevated cholesterol     Generalized anxiety disorder     GERD without esophagitis     Glaucoma     Glaucoma     High triglycerides     [place] (hard of hearing)     Hyperlipemia     Hypertension     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    Ill-defined condition     some memory loss    Muscle wasting and atrophy, not elsewhere classified, left shoulder     Obstructive sleep apnea     Pain, unspecified     Personal history of COVID-19     Restless legs syndrome     Unspecified sleep apnea     C PAP    Urethral stricture        Chief Complaint for this Admission:    Chief Complaint   Patient presents with    Shortness of Breath       Prior to Admission Medications:   Prior to Admission Medications   Prescriptions Last Dose Informant Patient Reported? Taking?   acetaminophen (TYLENOL) 325 mg tablet   Yes No   Sig: Take 650 mg by mouth every four (4) hours as needed for Pain. aspirin 81 mg chewable tablet   Yes No   Sig: Take 81 mg by mouth daily. atorvastatin (LIPITOR) 40 mg tablet   Yes Yes   Sig: Take 40 mg by mouth daily. dextrose 40% (Glucose GeL) 40 % oral gel   Yes Yes   Sig: Take 15 g by mouth as needed for Hypoglycemia. donepeziL (ARICEPT) 10 mg tablet   Yes No   Sig: Take 10 mg by mouth daily. famotidine (PEPCID) 20 mg tablet   Yes Yes   Sig: Take 20 mg by mouth nightly. glucagon (Glucagon Emergency Kit, human,) 1 mg solr injection   Yes No   Sig: by IntraVENous route once. insulin lispro (HUMALOG) 100 unit/mL kwikpen   Yes No   Sig: by SubCUTAneous route Before breakfast, lunch, dinner and at bedtime. Sliding scale   latanoprost (XALATAN) 0.005 % ophthalmic solution   Yes No   Sig: Administer 1 Drop to both eyes nightly. memantine (NAMENDA) 10 mg tablet   Yes No   Sig: Take 10 mg by mouth two (2) times a day. mirtazapine (REMERON) 7.5 mg tablet   Yes No   Sig: Take 7.5 mg by mouth nightly. montelukast (SINGULAIR) 10 mg tablet   Yes No   Sig: Take 10 mg by mouth daily. ranolazine ER (RANEXA) 500 mg SR tablet   Yes No   Sig: Take  by mouth two (2) times a day. sertraline (ZOLOFT) 100 mg tablet   Yes No   Sig: Take 150 mg by mouth nightly. simethicone (Gas Relief, simethicone,) 80 mg chewable tablet   Yes No   Sig: Take 80 mg by mouth every six (6) hours as needed for Flatulence.    timolol (TIMOPTIC OCUDOSE) 0.5 % Dpet   Yes No   Sig: Administer 1 Drop to both eyes two (2) times a day. Facility-Administered Medications: None         Thank you for allowing pharmacy to participate in the coordination of this patient's care. If you have any other questions, please contact the medication reconciliation pharmacist at x [phone]. Daunta Prasad, Pharm. D., [facility]

## 2023-03-20 NOTE — CONSULTS
[facility], MD 3/20/2023   Cardiology Care Note                  [x]Initial visit     []Established visit     Patient Name: Mat Manuel - XKR:[birth year] - PVJ:[id number] 80 y.o. Reason for initial visit: Myocardial infarction and chest pain     Assessment and Plan     Acute extensive myocardial infarction with a troponin of 90,000. Patient with known prior coronary disease and prior stents to the RCA and circumflex. He has ST segment changes which are hard to interpret based on the conduction delay we will repeat EKG. He has abdominal and chest pain and elevated white count and may have acute abdominal pathology. I would suggest that noncontrast CT of the abdomen and chest to be done. If this shows no ruptured aneurysm then can start on heparin. We will then attempt to control heart rate with the use of beta-blockers if blood pressure stabilizes. He is likely septic with high demand and then high likelihood to develop worsening of chronic severe systolic heart failure. Antibiotics and sepsis care per primary team.  Follow-up lactic acid from 8 this morning to noon has increased from 11.5-14.5 the BNP is greater than 35,000 which is not a particular surprise. He is in critical edition and acutely ill with high risk for fatal demise. Problem list  1. Acute extensive myocardial infarction NSTEMI with features of ST changes high risk. 2.  Acute on chronic systolic congestive heart failure, EF was 25% in January  3. Abdominal and chest pain  4. Chronic CAD with multiple prior stents  5. Leukocytosis elevated lactate consistent with sepsis defer final diagnoses to primary team  6. Liver transaminitis due to sepsis and shock  7. Multifactorial shock  8. Hypothermia  9. History of iliac disease  10. History of diabetes  11.   Acute on chronic kidney failure   SUBJECTIVE:        HPI: Eleanor Chino Raman Meléndez is a 80 y.o. male with multiple medical problems. He was at [place] where he stays and became short of breath. His grandson was called this morning because he had sats that were low. Per chart it looks like he was on for sats of 55%. He got 10 L with EMS and his sats 95% with a nonrebreather and nebulizer. We were called urgently to see the patient this morning and at that time he was lying on his left side and some pain. He described that his main problem was abdominal pain he did not feel short of breath and he also had some chest pressure-like feeling. He is that pain had been persistent. I spoke to the grandson about CODE STATUS. He was in a [facility]ger [facility]  He is seen with his grandson at bedside. His grandson indicated that they had power of  and had previous conversations and the patient had leaned against interventional procedures. I spoke to the patient to was in some pain and difficulty was difficult for him to assess. Notable labs on admission include leukocytosis of 17,000 bicarb of 13 creatinine of 2.4 was previously 1.1 markedly elevated LFTs with transaminitis and lactate level of 1.5 potassium of 5.1 and bilateral effusions. We felt that given his JOSÉ MIGUEL and multiple comorbidities including dementia and acute on chronic systolic heart failure that he would be a poor candidate for invasive procedures with a high likelihood of complete renal shutdown with dye or stroke. Troponin is 92,833. Chest x-ray showed bilateral effusions with shallow inspiration  Since seen this morning has been placed on Levaquin bicarb drip and being evaluated by the ICU the ICU team is indicated to me that the patient will be a no code. Previously seen 1/24/2023 with CAD and CHF.   At that time he had an NSTEMI with a troponin of 2497 without chest pain dementia ground-level fall chronic CNS infarcts acute on chronic systolic heart failure EF was 45% in 2017 but in January 2023 was 20 to 25% multiple echoes in 2017 showed normal EF so the EF dropped was new. Prior CAD with PCI in 2016 prior circumflex stent prior to 2007 and a cath in 2007 showed open stent at that time. Had an inferior STEMI 2/3/2016 with severe and mid distal RCA disease with PCI of the RCA with JÚNIOR. Cath 2/9/2016 patent RCA stent nonobstructive CAD circumflex and LAD. Cath 9/12/2016 patent RCA and left circumflex stents with mild to moderate CAD at that time. Cath 2007 patent circumflex stent. Also has a history of iliac stenosis diabetes CKD and bradycardia. He has chronic mobility impairment using a cane he had moved to [place] from [place] in 2018 or so. AAA repair December 2006    Interval,overnight:   Patient Vitals for the past 12 hrs:   Temp Pulse Resp BP SpO2   03/20/23 1245 -- 76 28 107/84 97 %   03/20/23 1145 -- 88 19 113/68 --   03/20/23 1103 97.8 °F (36.6 °C) 97 -- (!) 111/90 93 %   03/20/23 1045 97.6 °F (36.4 °C) 100 -- 96/65 --   03/20/23 0930 (!) 96.3 °F (35.7 °C) 88 22 115/78 98 %   03/20/23 0925 -- -- -- -- 98 %   03/20/23 0919 (!) 95.8 °F (35.4 °C) -- -- -- --   03/20/23 0915 -- 88 21 107/83 93 %   03/20/23 0900 -- 85 26 100/81 96 %   03/20/23 0852 (!) 95.2 °F (35.1 °C) -- -- -- --   03/20/23 0824 (!) 94.7 °F (34.8 °C) -- -- -- --   03/20/23 0815 -- 82 22 (!) 123/90 98 %   03/20/23 0800 -- 80 27 (!) 116/93 96 %   03/20/23 0730 -- -- -- -- 97 %   03/20/23 0729 (!) 93.5 °F (34.2 °C) 79 26 (!) 153/130 (!) 81 %      ___________________________________________________________  CAD  AS  CHF systolic chronic   ECHO [facility] 11-21-17   Moderate LCH, EF 37-45%, AMANDA 1.6 cm2 and mean of 9 mm Hg  NUKE 6-17-17 EF 51% normal perfusion  Echo 6-14-17 EF 55% mean 10 and Amanda 1.6  PCI POBA 2-3-16 distal RCA  Cath 6-2008  LM 20; LAD MLI, Circ stent to OM1 patent, RCA MLI  MI and PCI to Circ 2002    A.  Cardiac cath: 9/5/07 due to false positive NST: Patent LCx stent with otherwise nonobstructing CAD. EF 55%  B. Negative NST on 01/29/2016  C. Hx Inferior STEMI s/p cardiac cath: 2/3/16 by Dr. Lan Godinez: Severe mid and distal RCA disease with BERLIN 0 flow in prl. Successful PCI of RCA with JÚNIOR  D. ACS s/p cardiac catheterization 2/9/16 by Dr. Rudy Andujar: patent RCA stents. Non-obstructive CAD LCx and LAD-unchanged from cardiac catheterization 2/3/16    [facility] 9/12/16, Dr. Joby Murillo, [facility]: Patent RCA stents and LCx stent with mild-moderate/medically-treated CAD      HTN, AAA repair, high grade iliac stenosis, DM2, COPD, ADRIAN, XOL, dementia,JOSÉ MIGUEL/CKD   Cardiac testing  01/22/23    ECHO ADULT COMPLETE 01/24/2023 1/24/2023    Interpretation Summary    Left Ventricle: Severely reduced left ventricular systolic function with a visually estimated EF of 20 - 25%. Left ventricle size is normal. Normal wall thickness. Moderate global hypokinesis present. Abnormal diastolic function. Right Ventricle: Right ventricle is mildly dilated. Normal wall thickness. Aortic Valve: Findings consistent with myxomatous degeneration. Mild to moderate regurgitation. Mitral Valve: Mild to moderate regurgitation. Pulmonary Arteries: Mild pulmonary hypertension present. The estimated PASP is 40 mmHg. Pericardium: Small (<1 cm) localized pericardial effusion present. Interatrial Septum: No interatrial shunt visualized with color Doppler. Grade 0 Absence of bubbles. Agitated saline study was negative with and without provocation. Signed by: Jerry Johnson MD on 1/24/2023  1:48 PM      06/14/17    NM CARDIAC SPECT W STRS/REST MULT 06/16/2017 6/16/2017    Narrative  Nuclear gated cardiac SPECT imaging    Indication: Angina    Technique: Patient was given 10.8 mCi sestamibi for resting and 33 mCi sestamibi  for stress. Gated SPECT imaging was performed with ejection fraction and wall  motion studies. Melinda Schultz. Exam reviewed with Dr. Pilo Jaimes. Findings: . Ejection fraction is normal at 51 %.  Normal wall motion studies are  identified. There is no evidence for ischemia or infarction. Normal study    Impression  Impression: Negative cardiac gated SPECT imaging scan. Signed by: Bonny Cook MD on 6/16/2017  2:40 PM      Most recent HS troponins:  Recent Labs     03/20/23 0726   TROPHS 56,510*     Lab Results   Component Value Date/Time    Creatinine, POC 1.8 (H) 03/20/2023 07:23 AM    Creatinine 2.43 (H) 03/20/2023 07:26 AM     Lab Results   Component Value Date/Time    HGB 14.0 03/20/2023 07:26 AM      Physical Exam  Vitals: Patient Vitals for the past 12 hrs:   Temp Pulse Resp BP SpO2   03/20/23 1245 -- 76 28 107/84 97 %   03/20/23 1145 -- 88 19 113/68 --   03/20/23 1103 97.8 °F (36.6 °C) 97 -- (!) 111/90 93 %   03/20/23 1045 97.6 °F (36.4 °C) 100 -- 96/65 --   03/20/23 0930 (!) 96.3 °F (35.7 °C) 88 22 115/78 98 %   03/20/23 0925 -- -- -- -- 98 %   03/20/23 0919 (!) 95.8 °F (35.4 °C) -- -- -- --   03/20/23 0915 -- 88 21 107/83 93 %   03/20/23 0900 -- 85 26 100/81 96 %   03/20/23 0852 (!) 95.2 °F (35.1 °C) -- -- -- --   03/20/23 0824 (!) 94.7 °F (34.8 °C) -- -- -- --   03/20/23 0815 -- 82 22 (!) 123/90 98 %   03/20/23 0800 -- 80 27 (!) 116/93 96 %   03/20/23 0730 -- -- -- -- 97 %   03/20/23 0729 (!) 93.5 °F (34.2 °C) 79 26 (!) 153/130 (!) 81 %      Telemetry: Sinus tachycardia  pacs   General:    Alert, cooperative, mod  distress, appears stated age   Neck:   Supple  and no JVD. Back:     Symmetric   Lungs:   Decreased breath sounds and air movement with some wheezing on expiration   Heart[de-identified]    Regular rate and rhythm with ectopy difficult to hear heart sounds  No murmur   S1, S2 normal, no click, rub or gallop. Abdomen:     Soft, non-distended   Extremities:   Extremities normal, atraumatic, no cyanosis or edema.    Skin:   Skin color normal. No systemic rashes or lesions on visible areas   Neurologic:   Alert, Moves all extremities      ECG:   Results for orders placed or performed during the hospital encounter of 03/20/23   EKG, 12 LEAD, INITIAL   Result Value Ref Range    Ventricular Rate 78 BPM    Atrial Rate 78 BPM    P-R Interval 264 ms    QRS Duration 172 ms    Q-T Interval 492 ms    QTC Calculation (Bezet) 560 ms    Calculated P Axis 9 degrees    Calculated R Axis 54 degrees    Calculated T Axis -110 degrees    Diagnosis       Sinus rhythm with 1st degree AV block  Nonspecific intraventricular block  Left ventricular hypertrophy with repolarization abnormality ( [employer]   product )  Inferior infarct , age undetermined  Abnormal ECG  When compared with ECG of 22-JAN-2023 23:24,  NJ interval has increased  Nonspecific intraventricular block has replaced Left bundle branch block  Inferior infarct is now present         Review of Systems  []All other systems reviewed and all negative except as written in HPI  [x] Patient unable to provide secondary to condition   has a past medical history of Acid reflux, Allergic contact dermatitis due to other chemical products, Allergic rhinitis, Alzheimer's disease with late onset (CODE) ([facility] [address] [address] Aneurysm of aorta (HCC), Angina pectoris ([address] Aortic stenosis (10/06/2019), Atherosclerotic heart disease of native coronary artery without angina pectoris, Cellulitis, Chronic kidney disease, stage 3 (HCC), Chronic obstructive pulmonary disease ([address] Coronary artery disease, Dementia ([address] Depression, Diabetes mellitus ([address] Dry eye syndrome of bilateral lacrimal glands, Elevated cholesterol, Generalized anxiety disorder, GERD without esophagitis, Glaucoma, Glaucoma, High triglycerides, [place] (hard of hearing), Hyperlipemia, Hypertension, Iliac aneurysm ([address] (10/06/2019), Ill-defined condition, Muscle wasting and atrophy, not elsewhere classified, left shoulder, Obstructive sleep apnea, Pain, unspecified, Personal history of COVID-19, Restless legs syndrome, Unspecified sleep apnea, and Urethral stricture.     has a past surgical history that includes hx turp (2004); hx aaa repair (12/13/2006 Dr Ha Maher); hx appendectomy; hx coronary stent placement (2002); colonoscopy (11/3/2014); and colonoscopy (N/A, 2/1/2017). Social Hx:  reports that he quit smoking about 48 years ago. His smoking use included cigarettes. He has a 24.00 pack-year smoking history. He has never used smokeless tobacco. He reports current alcohol use. He reports that he does not use drugs. Family Hx: family history is not on file. Allergies   Allergen Reactions    Morphine Rash and Itching     mild    Tuberculin Julia-Ppd Swelling       OBJECTIVE:Exam as above   Wt Readings from Last 3 Encounters:   01/25/23 168 lb 14 oz (76.6 kg)   10/02/19 220 lb (99.8 kg)   06/19/18 200 lb (90.7 kg)       Intake/Output Summary (Last 24 hours) at 3/20/2023 1300  Last data filed at 3/20/2023 1105  Gross per 24 hour   Intake 100 ml   Output --   Net 100 ml      Data Review:   Labs:  No results for input(s): CPK, TROIQ in the last 72 hours. No lab exists for component: CKQMB, CPKMB, BMPP  Recent Labs     03/20/23  0726      K 5.1   *   CO2 13*   BUN 35*   CREA 2.43*   *   CA 7.9*     Recent Labs     03/20/23  0726   WBC 17.6*   HGB 14.0   HCT 46.5        Recent Labs     03/20/23  0726        No results for input(s): CHOL, LDLC in the last 72 hours. No lab exists for component: TGL, HDLC,  HBA1C  No results for input(s): CRP, TSH, TSHEXT in the last 72 hours. No lab exists for component: ESR  Radiology: XR Results (most recent):  Results from Hospital Encounter encounter on 03/20/23    XR CHEST PORT    Narrative  EXAM:  XR CHEST PORT    INDICATION: Shortness of breath    COMPARISON: None    . TECHNIQUE: portable chest AP view    FINDINGS: The inspiration is shallow with bilateral pleural effusion mild  interstitial edema. The a heart size is prominent. Impression  Bilateral effusions.     Current meds:    Current Facility-Administered Medications:     sodium bicarbonate (8.4%) 150 mEq in dextrose 5% 1,000 mL infusion, , IntraVENous, CONTINUOUS, Alxe Hernandez MD    sodium chloride (NS) flush 5-40 mL, 5-40 mL, IntraVENous, Q8H, Alex Hernandez MD    sodium chloride (NS) flush 5-40 mL, 5-40 mL, IntraVENous, PRN, Alex Hernandez MD    acetaminophen (TYLENOL) tablet 650 mg, 650 mg, Oral, Q6H PRN **OR** acetaminophen (TYLENOL) suppository 650 mg, 650 mg, Rectal, Q6H PRN, Alex Hernandez MD    polyethylene glycol (MIRALAX) packet 17 g, 17 g, Oral, DAILY PRN, Alex Hernandez MD    ondansetron (ZOFRAN ODT) tablet 4 mg, 4 mg, Oral, Q8H PRN **OR** ondansetron (ZOFRAN) injection 4 mg, 4 mg, IntraVENous, Q6H PRN, Alex Hernandez MD    famotidine (PF) (PEPCID) 20 mg in 0.9% sodium chloride 10 mL injection, 20 mg, IntraVENous, DAILY, Alex Hernandez MD    Current Outpatient Medications:     atorvastatin (LIPITOR) 40 mg tablet, Take 40 mg by mouth daily. , Disp: , Rfl:     dextrose 40% (Glucose GeL) 40 % oral gel, Take 15 g by mouth as needed for Hypoglycemia., Disp: , Rfl:     famotidine (PEPCID) 20 mg tablet, Take 20 mg by mouth nightly., Disp: , Rfl:     acetaminophen (TYLENOL) 325 mg tablet, Take 650 mg by mouth every four (4) hours as needed for Pain., Disp: , Rfl:     aspirin 81 mg chewable tablet, Take 81 mg by mouth daily. , Disp: , Rfl:     simethicone (Gas Relief, simethicone,) 80 mg chewable tablet, Take 80 mg by mouth every six (6) hours as needed for Flatulence. , Disp: , Rfl:     glucagon (Glucagon Emergency Kit, human,) 1 mg solr injection, by IntraVENous route once., Disp: , Rfl:     insulin lispro (HUMALOG) 100 unit/mL kwikpen, by SubCUTAneous route Before breakfast, lunch, dinner and at bedtime. Sliding scale, Disp: , Rfl:     mirtazapine (REMERON) 7.5 mg tablet, Take 7.5 mg by mouth nightly., Disp: , Rfl:     ranolazine ER (RANEXA) 500 mg SR tablet, Take 500 mg by mouth two (2) times a day., Disp: , Rfl:     memantine (NAMENDA) 10 mg tablet, Take 10 mg by mouth two (2) times a day. , Disp: , Rfl:     montelukast (SINGULAIR) 10 mg tablet, Take 10 mg by mouth daily. , Disp: , Rfl:     donepeziL (ARICEPT) 10 mg tablet, Take 10 mg by mouth daily. , Disp: , Rfl:     timolol (TIMOPTIC OCUDOSE) 0.5 % Dpet, Administer 1 Drop to both eyes two (2) times a day.  , Disp: , Rfl:     sertraline (ZOLOFT) 100 mg tablet, Take 150 mg by mouth nightly., Disp: , Rfl:     latanoprost (XALATAN) 0.005 % ophthalmic solution, Administer 1 Drop to both eyes nightly.  , Disp: , Rfl:   (Not in a hospital admission)     Patient Care Team:  Chet Eli MD as PCP - General (Family Medicine)  Horacio Madison MD (Internal Medicine Physician)   CT Results (most recent):  Results from Hospital Encounter encounter on 01/22/23    CT HEAD WO CONT    Narrative  EXAM: CT HEAD WO CONT    INDICATION: Fall    COMPARISON: 1/24/2023. CONTRAST: None. TECHNIQUE: Unenhanced CT of the head was performed using 5 mm images. Brain and  bone windows were generated. Coronal and sagittal reformats. CT dose reduction  was achieved through use of a standardized protocol tailored for this  examination and automatic exposure control for dose modulation. FINDINGS:  The ventricles and sulci are stable in size, shape and configuration. There is  stable periventricular white matter hypodensity. A well-defined infarct is  present in the right parietal lobe. The left frontal infarct has superimposed  effacement of cortical sulci, suggesting acute superimposed on old infarct. There is persistent hyperdensity in the left medial occipital lobe, previously  described represent hemosiderin. A stable lacunar infarct is present in the  right external capsule. There is no intracranial hemorrhage, extra-axial  collection. The basilar cisterns are open. The bone windows demonstrate no abnormalities. The visualized portions of the  paranasal sinuses and mastoid air cells are clear.     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

## 2023-03-20 NOTE — ED PROVIDER NOTES
They are trying to determine whether this patient should go to CCU this is an 41-year-old male who has a history of chronic systolic heart failure and his cerebrovascular accident. He also has a history of coronary artery disease and angina. 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 This is noted to be actually an old AAA sac wrapped around the graft. He was found this morning by staff to be extremely short of breath. His sats were in the mid 50 range. Patient has some dementia and is unable to provide a reasonable history. There is no history of any fever or chill, cough or shortness of breath leading up to this event. He was last known well last night when he went to bed. He was found this morning in this condition and EMS was called. On 10 L of nonrebreather the patient's sats improved. Patient continues at this time with you shortness of breath. Unable to obtain any reasonable sat reading.         Past Medical History:   Diagnosis Date    Acid reflux     Allergic contact dermatitis due to other chemical products     Allergic rhinitis     Alzheimer's disease with late onset (CODE) ([address]     Aneurysm of aorta (HCC)     Angina pectoris ([address]     Aortic stenosis 10/06/2019    Atherosclerotic heart disease of native coronary artery without angina pectoris     Cellulitis     Chronic kidney disease, stage 3 (HCC)     Chronic obstructive pulmonary disease (HCC)     Coronary artery disease     Dementia (HCC)     Depression     Diabetes mellitus ([address]     Dry eye syndrome of bilateral lacrimal glands     Elevated cholesterol     Generalized anxiety disorder     GERD without esophagitis     Glaucoma     Glaucoma     High triglycerides     [place] (hard of hearing)     Hyperlipemia     Hypertension     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    Ill-defined condition     some memory loss    Muscle wasting and atrophy, not elsewhere classified, left shoulder     Obstructive sleep apnea Pain, unspecified     Personal history of COVID-19     Restless legs syndrome     Unspecified sleep apnea     C PAP    Urethral stricture        Past Surgical History:   Procedure Laterality Date    COLONOSCOPY  11/3/2014         COLONOSCOPY N/A 2017    COLONOSCOPY cold bx polypectomy and heated polypectomy and clipping performed by Meaghan Vo MD at [place] [address] AAA REPAIR  2006 Dr Asia Puentes  2002    HX TURP  2004         No family history on file. Social History     Socioeconomic History    Marital status:      Spouse name: Not on file    Number of children: Not on file    Years of education: Not on file    Highest education level: Not on file   Occupational History    Not on file   Tobacco Use    Smoking status: Former     Packs/day: 2.00     Years: 12.00     Pack years: 24.00     Types: Cigarettes     Quit date: 1975     Years since quittin.2    Smokeless tobacco: Never   Substance and Sexual Activity    Alcohol use: Yes     Comment: very moderately    Drug use: No    Sexual activity: Never     Partners: Female   Other Topics Concern    Not on file   Social History Narrative    Not on file     Social Determinants of Health     Financial Resource Strain: Not on file   Food Insecurity: Not on file   Transportation Needs: Not on file   Physical Activity: Not on file   Stress: Not on file   Social Connections: Not on file   Intimate Partner Violence: Not on file   Housing Stability: Not on file         ALLERGIES: Morphine and Tuberculin beatrice-ppd    Review of Systems   Reason unable to perform ROS: Alzheimers. There were no vitals filed for this visit. Physical Exam  Vitals and nursing note reviewed. Constitutional:       General: He is in acute distress. Appearance: He is well-developed. He is ill-appearing. Comments:  This is an 68-year-old male who presents with shortness of breath and history of CHF.  Vital signs are as noted. HENT:      Head: Normocephalic and atraumatic. Nose: Nose normal.      Mouth/Throat:      Mouth: Mucous membranes are moist.   Eyes:      General: No scleral icterus. Conjunctiva/sclera: Conjunctivae normal.      Pupils: Pupils are equal, round, and reactive to light. Neck:      Thyroid: No thyromegaly. Vascular: No JVD. Trachea: No tracheal deviation. Comments: No carotid bruits noted. Cardiovascular:      Rate and Rhythm: Normal rate and regular rhythm. Heart sounds: Normal heart sounds. No murmur heard. No friction rub. No gallop. Pulmonary:      Effort: Pulmonary effort is normal. No respiratory distress. Breath sounds: Wheezing (Few bilaterally. No consolidation is noted.) present. No rhonchi or rales. Chest:      Chest wall: No mass, deformity or tenderness. Abdominal:      General: Bowel sounds are normal. There is no distension. Palpations: Abdomen is soft. There is no mass. Tenderness: There is no abdominal tenderness. There is no guarding or rebound. Musculoskeletal:         General: No tenderness. Normal range of motion. Cervical back: Normal range of motion and neck supple. Right lower leg: No edema. Left lower leg: No edema. Lymphadenopathy:      Cervical: No cervical adenopathy. Skin:     General: Skin is warm and dry. Capillary Refill: Capillary refill takes 2 to 3 seconds. Findings: No erythema or rash. Neurological:      General: No focal deficit present. Mental Status: He is alert. Cranial Nerves: No cranial nerve deficit. Coordination: Coordination normal.      Deep Tendon Reflexes: Reflexes are normal and symmetric. Comments: Patient is somewhat responsive to verbal stimulus but does not offer much in terms of history or complaint. Psychiatric:         Mood and Affect: Mood is not anxious.          Behavior: Behavior normal.         Thought Content: Thought content normal.         Judgment: Judgment normal.      Comments: Unable to determine due to mental status        Medical Decision Making  This is an 80-year-old male who presents with acute onset of shortness of breath at some point during the night. He has a history of congestive heart failure but no known history of COPD. He was found by EMS to have a sat in the 50s and was placed on 10 L by mask and his sats improved. Arrival here were not able to get a good accurate reading on saturations. He is a little confused. His venous pH was just under 7. There is no history of fever or chill and no cough. He will be given neb treatments. I will also ask for BiPAP, routine blood work including BMP troponin. EKG is done as well as a portable chest x-ray. We will continue BiPAP and nebulizer treatments pending x-ray and labs. Amount and/or Complexity of Data Reviewed  Labs: ordered. Decision-making details documented in ED Course. Radiology: ordered and independent interpretation performed. Decision-making details documented in ED Course. ECG/medicine tests: ordered and independent interpretation performed. Decision-making details documented in ED Course. Risk  OTC drugs. Prescription drug management. Decision regarding hospitalization. ED Course as of 03/20/23 0948   Mon Mar 20, 2023   0821 Patient's bedside i-STAT is noted. Obtaining a blood gas and repeat lactate. Chest x-ray does not demonstrate any acute infectious process. There is some mild bilateral effusion with mild increased interstitial markings. Suspect there is some failure associated with this presentation. He is somewhat hypothermic. Temperature was noted to be 93.5 will repeat. He is satting 97 to 98% on 15 L of high flow oxygen. He appears to be resting comfortably at this time.  [RP]      ED Course User Index  [RP] Adan Barclay MD       Procedures            EDMD EKG interpretation: There is a normal sinus rhythm at 78 beats a minute. No ectopy is noted. Axis is normal.  There are some findings of left bundle brch block with changes in the inferior and lateral leads which are more pronounced than his tracing in January of this year. Tiffany Haddad MD    The patient's troponin is reported back at 15,892. His lactic is elevated. His temperature was 93 degrees. He was placed on a [place]hugger and hemodynamically he is remained stable. His oxygen saturations have remained appropriate on high flow oxygen. He was not given IV fluids because of the urine was ordered. Chest x-ray fails to demonstrate any acute infectious process. He did have findings of some mild pulmonary edema and some pleural effusion bilaterally. It was felt in the face of BNP of 35,000, giving IV fluids and a larger volumes would be contraindicated. Temperature is come back up to 96. There is no clear etiology for infection but he is being treated by septic protocol with 2 antibiotics at this time. Urine will be cultured. Consult is placed with cardiology and will also discuss with ICU. Patient has had a cardiologist in [place]. He has no cardiologist here. The patient's guardian is here and is telling me that in 2019 he had his last stent placed at the [facility] here in [address] He does have a history of coronary disease but has done well since that time without chest pain. He is not aware that the patient has had any chest pain, fevers or other acute symptomatology  This episode. He normally is alert and oriented with some confusion. He is quite deaf. We will consult the intensivist and also the cardiologist.    Spoken with the ICU attending after speaking with family.   The ICU attending is trying to decide whether to admit to ICU versus CCU

## 2023-03-20 NOTE — PROGRESS NOTES
Spiritual Care Assessment/Progress Note  [facility]      NAME: Mat Manuel      MRN: [id number]  AGE: 80 y.o. SEX: male  Sabianism Affiliation: Other   Language: English     3/20/2023     Total Time (in minutes): 30     Spiritual Assessment begun in [address] through conversation with:         []Patient        [x] Family    [] Friend(s)        Reason for Consult: Death, Inpatient     Spiritual beliefs: (Please include comment if needed)     [x] Identifies with a meg tradition: Mormon        [] Supported by a meg community:            [] Claims no spiritual orientation:           [] Seeking spiritual identity:                [] Adheres to an individual form of spirituality:           [] Not able to assess:                           Identified resources for coping:      [] Prayer                               [] Music                  [] Guided Imagery     [x] Family/friends                 [] Pet visits     [] Devotional reading                         [] Unknown     [] Other:                                               Interventions offered during this visit: (See comments for more details)          Family/Friend(s):  Affirmation of emotions/emotional suffering, Affirmation of meg, End of life issues discussed, Iconic (affirming the presence of God/Higher Power), Normalization of emotional/spiritual concerns, Coping skills reviewed/reinforced     Plan of Care:     [] Support spiritual and/or cultural needs    [] Support AMD and/or advance care planning process      [] Support grieving process   [] Coordinate Rites and/or Rituals    [] Coordination with community clergy   [] No spiritual needs identified at this time   [] Detailed Plan of Care below (See Comments)  [] Make referral to Music Therapy  [] Make referral to Pet Therapy     [] Make referral to Addiction services  [] Make referral to [facility]  [] Make referral to Spiritual Care Partner  [] No future visits requested [x] Follow up visits as needed     Visited in response to notification of patient's death. Met his step grandson Nikhil Muñiz just outside of his room. The patient was a , long time marriage to Saleem's paternal grandmother. Nikhil Muñiz spoke of the patient's life and relationships with others. He takes some hope in the patient's Felicita Cure. Remained with him until he left the ER.   Chaplain Sidra, MDiv, MS, [facility]

## 2023-03-20 NOTE — ED NOTES
1430-Called [facility] and left message. 1620-Notified  Dottie Lara that patient is going to [facility].

## 2023-03-20 NOTE — ED TRIAGE NOTES
Pt arrives via EMS from [place] with cc of sob. He was apparently on [facility] upon EMS arrival with sats at 55%. EMS got him up to 95-98% on 10LC non rebreather and one neb treatment on board.

## 2023-03-20 NOTE — PROGRESS NOTES
pt seen and examined and full note to follow  Last EF 1/2023 EF 20-25% , dialte RV, 1-2+ MR, TR  Recent stroke and GLF Jan 2023 with NSTEMi trop 2497  Acute on chronic systolic CHF   CAD PCI 6378,4635  Hx of iliac stenosis, DM, CKD, alex, moved her from [facility] in 2015    MI with trop 90K  STT changes but has LBBB, repeat EKG  Has CP and abdominal pain, high lactate  Working on clarifying with him code status and procedure status  Appears uncomfortable with abdominal pain  Suggest non contrast CT chest and abdomen-try r/o aneurysm  Leukocytosis 17k  Bicarb 13   Cr 2.4 was 1.1   LFTs high  Lactate 11.5   K 5.1  Bilateral effusions    Has JOSÉ MIGUEL so cath carries many high risks including stroke and permanent renal failure, may have underlying sepsis   No heparin until CT done

## 2023-03-20 NOTE — ED NOTES
This RN went on break. Second RN covering noted no pulse and 0 respirations. Patient DNR. Called Intensivist to come to bedside. Intensivist at bedside-called time of death at 0. Intensivist also called patient's grandson. Grandson now at bedside.

## 2023-03-20 NOTE — ED NOTES
While covering for primary RN, patient noted to have 0 respirations based on the monitor reading and HR becoming slower with wider complexes. No visual RR noted. No palpable pulse. Primary RN to bedside. Attempts being made to get in touch with intensivist. Pt noted to be a DNR per chart orders. 0 - Intensivist and Primary RN at bedside.

## 2023-03-22 LAB
ATRIAL RATE: 78 BPM
CALCULATED P AXIS, ECG09: 9 DEGREES
CALCULATED R AXIS, ECG10: 54 DEGREES
CALCULATED T AXIS, ECG11: -110 DEGREES
DIAGNOSIS, 93000: NORMAL
P-R INTERVAL, ECG05: 264 MS
Q-T INTERVAL, ECG07: 492 MS
QRS DURATION, ECG06: 172 MS
QTC CALCULATION (BEZET), ECG08: 560 MS
VENTRICULAR RATE, ECG03: 78 BPM

## 2023-03-25 LAB
BACTERIA SPEC CULT: NORMAL
SERVICE CMNT-IMP: NORMAL